# Patient Record
Sex: FEMALE | Race: WHITE | Employment: OTHER | URBAN - METROPOLITAN AREA
[De-identification: names, ages, dates, MRNs, and addresses within clinical notes are randomized per-mention and may not be internally consistent; named-entity substitution may affect disease eponyms.]

---

## 2017-05-01 ENCOUNTER — OFFICE VISIT (OUTPATIENT)
Dept: INTERNAL MEDICINE CLINIC | Age: 62
End: 2017-05-01

## 2017-05-01 VITALS
HEIGHT: 66 IN | DIASTOLIC BLOOD PRESSURE: 70 MMHG | WEIGHT: 151 LBS | RESPIRATION RATE: 16 BRPM | TEMPERATURE: 98.5 F | SYSTOLIC BLOOD PRESSURE: 130 MMHG | OXYGEN SATURATION: 98 % | BODY MASS INDEX: 24.27 KG/M2 | HEART RATE: 54 BPM

## 2017-05-01 DIAGNOSIS — I83.90 VARICOSE VEIN OF LEG: Primary | ICD-10-CM

## 2017-05-01 DIAGNOSIS — Z12.31 ENCOUNTER FOR SCREENING MAMMOGRAM FOR BREAST CANCER: ICD-10-CM

## 2017-05-01 NOTE — MR AVS SNAPSHOT
Visit Information Date & Time Provider Department Dept. Phone Encounter #  
 5/1/2017 11:00 AM Sean Garcia MD [facility] INTERNAL MEDICINE [place] [phone] [id number] Upcoming Health Maintenance Date Due Hepatitis C Screening 1955 DTaP/Tdap/Td series (1 - Tdap) 11/29/1976 PAP AKA CERVICAL CYTOLOGY 11/29/1976 ZOSTER VACCINE AGE 60> 11/29/2015 BREAST CANCER SCRN MAMMOGRAM 1/12/2017 INFLUENZA AGE 9 TO ADULT 8/1/2017 COLONOSCOPY 10/9/2019 Allergies as of 5/1/2017  Review Complete On: 5/1/2017 By: Abdulkadir uY LPN Severity Noted Reaction Type Reactions Latex  01/28/2014    Rash, Contact Dermatitis Statins-hmg-coa Reductase Inhibitors    Myalgia Current Immunizations  Reviewed on 12/15/2016 Name Date Influenza Vaccine (Quad) PF 12/15/2016, 1/28/2016 Not reviewed this visit You Were Diagnosed With   
  
 Codes Comments Varicose vein of leg    -  Primary ICD-10-CM: I83.90 ICD-9-CM: 454.9 Encounter for screening mammogram for breast cancer     ICD-10-CM: Z12.31 
ICD-9-CM: V76.12 Vitals BP Pulse Temp Resp Height(growth percentile) 130/70 (BP 1 Location: Right arm, BP Patient Position: Sitting) (!) 54 98.5 °F (36.9 °C) (Tympanic) 16 5' 6\" (1.676 m) Weight(growth percentile) SpO2 BMI OB Status Smoking Status 151 lb (68.5 kg) 98% 24.37 kg/m2 Hysterectomy Former Smoker Vitals History BMI and BSA Data Body Mass Index Body Surface Area  
 24.37 kg/m 2 1.79 m 2 Preferred Pharmacy Pharmacy Name Phone [facility] [address] [place] [address] [phone] Your Updated Medication List  
  
   
This list is accurate as of: 5/1/17 12:19 PM.  Always use your most recent med list.  
  
  
  
  
 clonazePAM 1 mg tablet Commonly known as:  Robby Moya Take 1 Tab by mouth nightly. fluticasone 50 mcg/actuation nasal spray Commonly known as:  Lawanda Clark 2 spray each nostril daily LEXAPRO 20 mg tablet Generic drug:  escitalopram oxalate Take 20 mg by mouth daily. propranolol LA 80 mg SR capsule Commonly known as:  INDERAL LA Take 1 Cap by mouth daily. We Performed the Following REFERRAL TO VASCULAR CENTER [WHX856 Custom] To-Do List   
 05/01/2017 Imaging:  ALBER 3D NEFTALI W MAMMO BI SCREENING INCL CAD   
  
 05/05/2017 11:30 AM  
  Appointment with [facility] ALBER REYES at [address] [place] [address] ([phone]) PAYMENT  For Non-Medicare patients - $15.00 will be collected from you at the time of your exam.  You will be billed $35.00 from the reading [facility]. OUTSIDE FILMS  - Any outside films related to the study being scheduled should be brought with you on the day of the exam.  If this cannot be done there may be a delay in the reading of the study. MEDICATIONS  - Patient must bring a complete list of all medications currently taking to include prescriptions, over-the-counter meds, herbals, vitamins & any dietary supplements  GENERAL INSTRUCTIONS  - On the day of your exam do not use any bath powder, deodorant or lotions on the armpit area. -Tenderness of breasts may cause an increase of discomfort during procedure. If you are experiencing breast tenderness on the day of your appointment and would like to reschedule, please call [phone]. Referral Information Referral ID Referred By Referred To  
  
 [id number] KIRILL, [address] [facility] [address] [place] [address] [address] [place], [address] Phone: [phone] Fax: [phone] Visits Status Start Date End Date 1 New Request 5/1/17 5/1/18 If your referral has a status of pending review or denied, additional information will be sent to support the outcome of this decision. Patient Instructions Health Maintenance Due Topic Date Due  
  Hepatitis C Test  1955  DTaP/Tdap/Td  (1 - Tdap) 11/29/1976  Cervical Cancer Screening  11/29/1976  Shingles Vaccine  11/29/2015  Breast Cancer Screening  01/12/2017 Introducing [facility] & HE[facility] SERVICES! Saúl Zuluaga introduces Jetpac patient portal. Now you can access parts of your medical record, email your doctor's office, and request medication refills online. 1. In your internet browser, go to [email or web address] 2. Click on the First Time User? Click Here link in the Sign In box. You will see the New Member Sign Up page. 3. Enter your Jetpac Access Code exactly as it appears below. You will not need to use this code after youve completed the sign-up process. If you do not sign up before the expiration date, you must request a new code. · Jetpac Access Code: [email or web address] 
Expires: 7/30/2017 12:19 PM 
 
4. Enter the last four digits of your Social Security Number (xxxx) and Date of Birth (mm/dd/yyyy) as indicated and click Submit. You will be taken to the next sign-up page. 5. Create a Jetpac ID. This will be your Jetpac login ID and cannot be changed, so think of one that is secure and easy to remember. 6. Create a Jetpac password. You can change your password at any time. 7. Enter your Password Reset Question and Answer. This can be used at a later time if you forget your password. 8. Enter your e-mail address. You will receive e-mail notification when new information is available in [address] 9. Click Sign Up. You can now view and download portions of your medical record. 10. Click the Download Summary menu link to download a portable copy of your medical information. If you have questions, please visit the Frequently Asked Questions section of the Jetpac website. Remember, Jetpac is NOT to be used for urgent needs. For medical emergencies, dial 911. Now available from your iPhone and Android! Please provide this summary of care documentation to your next provider. Your primary care clinician is listed as Themaria luz Ledezma. If you have any questions after today's visit, please call [phone].

## 2017-05-01 NOTE — PATIENT INSTRUCTIONS
Health Maintenance Due   Topic Date Due    Hepatitis C Test  1955    DTaP/Tdap/Td  (1 - Tdap) 11/29/1976    Cervical Cancer Screening  11/29/1976    Shingles Vaccine  11/29/2015    Breast Cancer Screening  01/12/2017

## 2017-05-01 NOTE — PROGRESS NOTES
1. Have you been to the ER, urgent care clinic since your last visit? Hospitalized since your last visit? No    2. Have you seen or consulted any other health care providers outside of the [address] [place] [address] since your last visit? Include any pap smears or colon screening.  April 17 - life line screening

## 2017-05-02 NOTE — PROGRESS NOTES
The patient presents to the office today with the chief complaint of left leg pain    HPI    The patient complains of five months of pain in her inner left calf. There is no history of trauma. The patient notes a varicose vein in that area from pregnancy years ago. Review of Systems   Respiratory: Negative for shortness of breath. Cardiovascular: Negative for chest pain and leg swelling. Allergies   Allergen Reactions    Latex Rash and Contact Dermatitis    Statins-Hmg-Coa Reductase Inhibitors Myalgia       Current Outpatient Prescriptions   Medication Sig Dispense Refill    clonazePAM (KLONOPIN) 1 mg tablet Take 1 Tab by mouth nightly.  fluticasone (FLONASE) 50 mcg/actuation nasal spray 2 spray each nostril daily 1 Bottle 1    propranolol LA (INDERAL LA) 80 mg SR capsule Take 1 Cap by mouth daily. 90 Cap 3    escitalopram (LEXAPRO) 20 mg tablet Take 20 mg by mouth daily. Past Medical History:   Diagnosis Date    Benign hematuria     Depressive disorder     Dermatitis     Palpitations     Pure hypercholesterolemia        Past Surgical History:   Procedure Laterality Date    HX HYSTERECTOMY      age 45       Social History     Social History    Marital status:      Spouse name: N/A    Number of children: N/A    Years of education: N/A     Occupational History    Not on file.      Social History Main Topics    Smoking status: Former Smoker     Quit date: 1/7/1994    Smokeless tobacco: Not on file    Alcohol use Yes    Drug use: Not on file    Sexual activity: Not on file     Other Topics Concern    Not on file     Social History Narrative       Patient does not have an advanced directive on file    Visit Vitals    /70 (BP 1 Location: Right arm, BP Patient Position: Sitting)    Pulse (!) 54    Temp 98.5 °F (36.9 °C) (Tympanic)    Resp 16    Ht 5' 6\" (1.676 m)    Wt 151 lb (68.5 kg)    SpO2 98%    BMI 24.37 kg/m2       Physical Exam   No Cervical Lymphadenopathy  No Supraclavicular Lymphadenopathy  Thyroid is Normal  Lungs are clear to ausculation and percussion  Heart:  S1 S2 are normal, No gallops, No mummers  No Carotid Bruits  Breasts:  Normal to inspection and palpation  Abdomen:  Normal Bowel Sounds. No tenderness. No masses. No Hepatomegaly or Splenomegly  LE:  Strong Pedal Pulses. No Edema. Area of tenderness at an isolated varicose veins left inner calf    BMI:  OK    No visits with results within 3 Month(s) from this visit. Latest known visit with results is:    Abstract on 01/03/2014   Component Date Value Ref Range Status    Colonoscopy, External 10/09/2009 sigmoid diverticulosis, 10 years   Final       .No results found for any visits on 05/01/17. Assessment / Plan      ICD-10-CM ICD-9-CM    1. Varicose vein of leg I83.90 454.9 REFERRAL TO VASCULAR CENTER   2. Encounter for screening mammogram for breast cancer Z12.31 V76.12 ALBER 3D NEFTALI W MAMMO BI SCREENING INCL CAD     To Vascular  Screening mammogram  she was advised to continue her maintenance medications    Follow-up Disposition:  Return in about 6 months (around 11/1/2017). I asked Tere Sharma if she has any questions and I answered the questions. Tere Sharma states that she understands the treatment plan and agrees with the treatment plan

## 2017-05-05 ENCOUNTER — HOSPITAL ENCOUNTER (OUTPATIENT)
Dept: MAMMOGRAPHY | Age: 62
Discharge: HOME | End: 2017-05-05
Attending: INTERNAL MEDICINE
Payer: COMMERCIAL

## 2017-05-05 DIAGNOSIS — Z12.31 ENCOUNTER FOR SCREENING MAMMOGRAM FOR BREAST CANCER: ICD-10-CM

## 2017-05-05 PROCEDURE — 77063 BREAST TOMOSYNTHESIS BI: CPT

## 2017-11-15 ENCOUNTER — OFFICE VISIT (OUTPATIENT)
Dept: INTERNAL MEDICINE CLINIC | Age: 62
End: 2017-11-15

## 2017-11-15 VITALS
OXYGEN SATURATION: 98 % | BODY MASS INDEX: 25.07 KG/M2 | HEIGHT: 66 IN | SYSTOLIC BLOOD PRESSURE: 120 MMHG | WEIGHT: 156 LBS | RESPIRATION RATE: 16 BRPM | HEART RATE: 60 BPM | TEMPERATURE: 98 F | DIASTOLIC BLOOD PRESSURE: 80 MMHG

## 2017-11-15 DIAGNOSIS — I83.90 VARICOSE VEIN OF LEG: ICD-10-CM

## 2017-11-15 DIAGNOSIS — J30.2 CHRONIC SEASONAL ALLERGIC RHINITIS DUE TO OTHER ALLERGEN: Primary | ICD-10-CM

## 2017-11-15 DIAGNOSIS — L98.9 SKIN LESION: ICD-10-CM

## 2017-11-15 DIAGNOSIS — Z11.59 ENCOUNTER FOR HEPATITIS C SCREENING TEST FOR LOW RISK PATIENT: ICD-10-CM

## 2017-11-15 RX ORDER — FLUTICASONE PROPIONATE 50 MCG
SPRAY, SUSPENSION (ML) NASAL
Qty: 1 BOTTLE | Refills: 1 | Status: SHIPPED | OUTPATIENT
Start: 2017-11-15

## 2017-11-15 NOTE — PROGRESS NOTES
1. Have you been to the ER, urgent care clinic since your last visit? Hospitalized since your last visit? No    2. Have you seen or consulted any other health care providers outside of the [address] [place] [address] since your last visit? Include any pap smears or colon screening.  No

## 2017-11-15 NOTE — MR AVS SNAPSHOT
Visit Information Date & Time Provider Department Dept. Phone Encounter #  
 11/15/2017 12:00 PM Tamra Zhao MD [facility] INTERNAL MEDICINE OF [place] [phone] [id number] Your Appointments 12/5/2017 11:30 AM  
Office Visit with [facility] ([facility]) Appt Note: iov, ref by dr Kimberly Shelby, vv  
 [address]  
[phone] [address]  
  
    
 6/20/2018 10:30 AM  
Follow Up with Tamra Zhao MD  
[facility] INTERNAL MEDICINE OF [facility] Appt Note: 7 month [address], [address] [facility] [address]  
  
   
 [address], [address] [facility] [address] Upcoming Health Maintenance Date Due Hepatitis C Screening 1955 DTaP/Tdap/Td series (1 - Tdap) 11/29/1976 PAP AKA CERVICAL CYTOLOGY 11/29/1976 ZOSTER VACCINE AGE 60> 9/29/2015 Influenza Age 5 to Adult 8/1/2017 BREAST CANCER SCRN MAMMOGRAM 5/5/2019 COLONOSCOPY 10/9/2019 Allergies as of 11/15/2017  Review Complete On: 11/15/2017 By: Lieutenant Renee LPN Severity Noted Reaction Type Reactions Latex  01/28/2014    Rash, Contact Dermatitis Statins-hmg-coa Reductase Inhibitors    Myalgia Current Immunizations  Reviewed on 12/15/2016 Name Date Influenza Vaccine (Quad) PF 12/15/2016, 1/28/2016 Not reviewed this visit You Were Diagnosed With   
  
 Codes Comments Chronic seasonal allergic rhinitis due to other allergen    -  Primary ICD-10-CM: J30.2 ICD-9-CM: 477.8 Varicose vein of leg     ICD-10-CM: I83.90 ICD-9-CM: 454.9 Encounter for hepatitis C screening test for low risk patient     ICD-10-CM: Z11.59 
ICD-9-CM: V73.89 Skin lesion     ICD-10-CM: L98.9 ICD-9-CM: 709.9 Vitals BP Pulse Temp Resp Height(growth percentile) Weight(growth percentile) 120/80 (BP 1 Location: Left arm, BP Patient Position: Sitting) 60 98 °F (36.7 °C) (Tympanic) 16 5' 6\" (1.676 m) 156 lb (70.8 kg) SpO2 BMI OB Status Smoking Status 98% 25.18 kg/m2 Hysterectomy Former Smoker Vitals History BMI and BSA Data Body Mass Index Body Surface Area  
 25.18 kg/m 2 1.82 m 2 Preferred Pharmacy Pharmacy Name Phone [facility] [address] [phone] Your Updated Medication List  
  
   
This list is accurate as of: 11/15/17 12:55 PM.  Always use your most recent med list.  
  
  
  
  
 clonazePAM 1 mg tablet Commonly known as:  Karyn Broaden Take 1 Tab by mouth nightly. fluticasone 50 mcg/actuation nasal spray Commonly known as:  FLONASE  
2 spray each nostril daily LEXAPRO 20 mg tablet Generic drug:  escitalopram oxalate Take 20 mg by mouth daily. propranolol LA 80 mg SR capsule Commonly known as:  INDERAL LA Take 1 Cap by mouth daily. Prescriptions Sent to Pharmacy Refills  
 fluticasone (FLONASE) 50 mcg/actuation nasal spray 1 Si spray each nostril daily Class: Normal  
 Pharmacy: [facility] [address] [place] [address] #: [phone] We Performed the Following REFERRAL TO DERMATOLOGY [REF19 Custom] Comments:  
 Skin lesion left forearm REFERRAL TO VASCULAR SURGERY [SXA917 Custom] Comments:  
 Varicose veins left leg To-Do List   
 11/15/2017 Lab:  HEPATITIS C AB   
  
 11/15/2017 Lab:  LIPID PANEL   
  
 11/15/2017 Lab:  METABOLIC PANEL, COMPREHENSIVE   
  
 2018 Lab:  CBC WITH AUTOMATED DIFF Referral Information Referral ID Referred By Referred To  
  
 [id number] Bee Benoit [address] [facility] [place], [address] Phone: [phone] Fax: [phone] Visits Status Start Date End Date 1 Authorized 11/15/17 11/15/18 If your referral has a status of pending review or denied, additional information will be sent to support the outcome of this decision. Referral ID Referred By Referred To  
 [id number] Yulia More, [address] [facility]  
   [address] [facility] Phone: [phone] Fax: [phone] Visits Status Start Date End Date 1 New Request 11/15/17 11/15/18 If your referral has a status of pending review or denied, additional information will be sent to support the outcome of this decision. Patient Instructions Health Maintenance Due Topic Date Due  
 Hepatitis C Test  1955  DTaP/Tdap/Td  (1 - Tdap) 11/29/1976  Cervical Cancer Screening  11/29/1976  Shingles Vaccine  09/29/2015  Flu Vaccine  08/01/2017 Introducing [facility] & HEALTH SERVICES! Kashif Hernandez introduces Wheebox patient portal. Now you can access parts of your medical record, email your doctor's office, and request medication refills online. 1. In your internet browser, go to [email or web address] 2. Click on the First Time User? Click Here link in the Sign In box. You will see the New Member Sign Up page. 3. Enter your Wheebox Access Code exactly as it appears below. You will not need to use this code after youve completed the sign-up process. If you do not sign up before the expiration date, you must request a new code. · Wheebox Access Code: [email or web address] Expires: 2/13/2018 12:54 PM 
 
4. Enter the last four digits of your Social Security Number (xxxx) and Date of Birth (mm/dd/yyyy) as indicated and click Submit. You will be taken to the next sign-up page. 5. Create a Wheebox ID. This will be your Drip Int login ID and cannot be changed, so think of one that is secure and easy to remember. 6. Create a TwentyFour6 password. You can change your password at any time. 7. Enter your Password Reset Question and Answer. This can be used at a later time if you forget your password. 8. Enter your e-mail address. You will receive e-mail notification when new information is available in [address] 9. Click Sign Up. You can now view and download portions of your medical record. 10. Click the Download Summary menu link to download a portable copy of your medical information. If you have questions, please visit the Frequently Asked Questions section of the TwentyFour6 website. Remember, TwentyFour6 is NOT to be used for urgent needs. For medical emergencies, dial 911. Now available from your iPhone and Android! Please provide this summary of care documentation to your next provider. Your primary care clinician is listed as Neftali Ferrara. If you have any questions after today's visit, please call [phone].

## 2017-11-15 NOTE — PATIENT INSTRUCTIONS
Health Maintenance Due   Topic Date Due    Hepatitis C Test  1955    DTaP/Tdap/Td  (1 - Tdap) 11/29/1976    Cervical Cancer Screening  11/29/1976    Shingles Vaccine  09/29/2015    Flu Vaccine  08/01/2017

## 2017-11-18 NOTE — PROGRESS NOTES
The patient presents to the office today with the chief complaint of left leg pain    HPI    The patient continues with pain in her left calf along a varicose vein. The patient complains of seasonal sinus congestion and drainage. The patient is due eligible for hepatitis C drainage      Review of Systems   Respiratory: Negative for shortness of breath. Cardiovascular: Negative for chest pain and leg swelling. Skin:        Skin lesion left leg       Allergies   Allergen Reactions    Latex Rash and Contact Dermatitis    Statins-Hmg-Coa Reductase Inhibitors Myalgia       Current Outpatient Prescriptions   Medication Sig Dispense Refill    fluticasone (FLONASE) 50 mcg/actuation nasal spray 2 spray each nostril daily 1 Bottle 1    clonazePAM (KLONOPIN) 1 mg tablet Take 1 Tab by mouth nightly.  propranolol LA (INDERAL LA) 80 mg SR capsule Take 1 Cap by mouth daily. 90 Cap 3    escitalopram (LEXAPRO) 20 mg tablet Take 20 mg by mouth daily. Past Medical History:   Diagnosis Date    Benign hematuria     Depressive disorder     Dermatitis     Palpitations     Pure hypercholesterolemia        Past Surgical History:   Procedure Laterality Date    HX HYSTERECTOMY      age 45       Social History     Social History    Marital status:      Spouse name: N/A    Number of children: N/A    Years of education: N/A     Occupational History    Not on file.      Social History Main Topics    Smoking status: Former Smoker     Quit date: 1/7/1994    Smokeless tobacco: Never Used    Alcohol use Yes    Drug use: Not on file    Sexual activity: Not on file     Other Topics Concern    Not on file     Social History Narrative       Patient does not have an advanced directive on file    Visit Vitals    /80 (BP 1 Location: Left arm, BP Patient Position: Sitting)    Pulse 60    Temp 98 °F (36.7 °C) (Tympanic)    Resp 16    Ht 5' 6\" (1.676 m)    Wt 156 lb (70.8 kg)    SpO2 98%    BMI 25.18 kg/m2       Physical Exam   No Cervical Lymphadenopathy  No Supraclavicular Lymphadenopathy  Thyroid is Normal  Lungs are normal to percussion. Clear to auscultation   Heart:  S1 S2 are normal, No gallops, No mummers  No Carotid Bruits  Abdomen:  Normal Bowel Sounds. No tenderness. No masses. No Hepatomegaly or Splenomegly  LE:  Strong Pedal Pulses. No Edema. Tenderness along a superficial vein in her left calf      BMI:  OK    No visits with results within 3 Month(s) from this visit. Latest known visit with results is:    Abstract on 01/03/2014   Component Date Value Ref Range Status    Colonoscopy, External 10/09/2009 sigmoid diverticulosis, 10 years   Final       .No results found for any visits on 11/15/17. Assessment / Plan      ICD-10-CM ICD-9-CM    1. Chronic seasonal allergic rhinitis due to other allergen J30.2 477.8 fluticasone (FLONASE) 50 mcg/actuation nasal spray      REFERRAL TO VASCULAR SURGERY      CBC WITH AUTOMATED DIFF      METABOLIC PANEL, COMPREHENSIVE      LIPID PANEL      HEPATITIS C AB   2. Varicose vein of leg I83.90 454.9 fluticasone (FLONASE) 50 mcg/actuation nasal spray      REFERRAL TO VASCULAR SURGERY      CBC WITH AUTOMATED DIFF      METABOLIC PANEL, COMPREHENSIVE      LIPID PANEL      HEPATITIS C AB   3. Encounter for hepatitis C screening test for low risk patient Z11.59 V73.89 fluticasone (FLONASE) 50 mcg/actuation nasal spray      REFERRAL TO VASCULAR SURGERY      CBC WITH AUTOMATED DIFF      METABOLIC PANEL, COMPREHENSIVE      LIPID PANEL      HEPATITIS C AB   4. Skin lesion L98.9 709.9 REFERRAL TO DERMATOLOGY       Labs  Add Flonase  she was advised to continue her maintenance medications  To vascular    Follow-up Disposition:  Return in about 6 months (around 5/15/2018). I asked Nick Qureshi if she has any questions and I answered the questions. Nick Sheridan Silence states that she understands the treatment plan and agrees with the treatment plan

## 2017-12-05 ENCOUNTER — OFFICE VISIT (OUTPATIENT)
Dept: VASCULAR SURGERY | Age: 62
End: 2017-12-05

## 2017-12-05 VITALS
DIASTOLIC BLOOD PRESSURE: 94 MMHG | RESPIRATION RATE: 11 BRPM | WEIGHT: 156 LBS | HEIGHT: 66 IN | SYSTOLIC BLOOD PRESSURE: 166 MMHG | BODY MASS INDEX: 25.07 KG/M2

## 2017-12-05 DIAGNOSIS — M79.89 LEG SWELLING: ICD-10-CM

## 2017-12-05 DIAGNOSIS — R52 PAINFUL VEINS: Primary | ICD-10-CM

## 2017-12-05 DIAGNOSIS — M79.605 PAIN OF LEFT LOWER EXTREMITY: ICD-10-CM

## 2017-12-05 DIAGNOSIS — R09.89 PAINFUL VEINS: Primary | ICD-10-CM

## 2017-12-05 NOTE — PROGRESS NOTES
Katelyn Montgomery    Chief Complaint   Patient presents with    New Patient    Varicose Veins     left pain       History and Physical    Ms Arvin Qureshi is here today with her , who is a patient of ours  She is referred for evaluation of painful varicose veins/reflux  She started noticing a prominent vein near her left knee when she was pregnant with her son (which was just about 30 years ago). She has a strong family history of varicose vein issues  Since earlier this year, she has had pretty constant pain at this varicosity. At times it bulges more and she has associated swelling. She has also noticed relatively new appearance of some additional veins in the outer left leg  She used to walk regularly for exercise and that now aggravates pain and she has to stop and rest, and now avoids that high level of activity. When I inquired if she takes anything for the pain, she states she tries to avoid having to take pain relievers, and just rest and allow the pain to resolve on its own over time  But she is frustrated with symptoms and is hopeful for treatment  She did seek out evaluation at another facility about 6 months ago, but was not given any recommendations for relief, and wanted another opinion     Past Medical History:   Diagnosis Date    Benign hematuria     Depressive disorder     Dermatitis     Palpitations     Pure hypercholesterolemia      Patient Active Problem List   Diagnosis Code    Dermatitis L30.9    Benign hematuria N02.9    Palpitations R00.2    Depressive disorder F32.9    Pure hypercholesterolemia E78.00     Past Surgical History:   Procedure Laterality Date    HX HYSTERECTOMY      age 45     Current Outpatient Prescriptions   Medication Sig Dispense Refill    fluticasone (FLONASE) 50 mcg/actuation nasal spray 2 spray each nostril daily 1 Bottle 1    clonazePAM (KLONOPIN) 1 mg tablet Take 1 Tab by mouth nightly.       propranolol LA (INDERAL LA) 80 mg SR capsule Take 1 Cap by mouth daily. 90 Cap 3    escitalopram (LEXAPRO) 20 mg tablet Take 20 mg by mouth daily. Allergies   Allergen Reactions    Latex Rash and Contact Dermatitis    Statins-Hmg-Coa Reductase Inhibitors Myalgia     Social History     Social History    Marital status:      Spouse name: N/A    Number of children: N/A    Years of education: N/A     Occupational History    Not on file. Social History Main Topics    Smoking status: Former Smoker     Quit date: 1/7/1994    Smokeless tobacco: Never Used    Alcohol use Yes    Drug use: Not on file    Sexual activity: Not on file     Other Topics Concern    Not on file     Social History Narrative      Family History   Problem Relation Age of Onset    Stroke Mother     Cancer Mother     Heart Disease Father     Diabetes Father     Hypertension Sister        Review of Systems    Review of Systems - History obtained from the patient  General ROS: negative  Psychological ROS: negative  Ophthalmic ROS: negative  Respiratory ROS: negative  Cardiovascular ROS: negative  Gastrointestinal ROS: negative  Musculoskeletal ROS: negative  Neurological ROS: negative  Dermatological ROS: negative  Vascular ROS: as per HPI        Physical Exam:    Visit Vitals    BP (!) 166/94 (BP 1 Location: Right arm, BP Patient Position: Sitting)    Resp 11    Ht 5' 6\" (1.676 m)    Wt 156 lb (70.8 kg)    BMI 25.18 kg/m2      General:  Alert, cooperative, no distress. Head:  Normocephalic, without obvious abnormality, atraumatic. Eyes:    Conjunctivae/corneas clear. Pupils equal, round, reactive to light. Extraocular movements intact. Extremities: Extremities normal, atraumatic, no cyanosis, trace edema. Small 3-4 mm varicosity near medial left knee. Patch of spider veins lateral left calf   Pulses: 2+ and symmetric all extremities. Skin: Skin color, texture, turgor normal. No rashes or lesions.      Vascular studies:  A study is on file from outlying facility in May 2017 stated some presence of reflux in the L SSV     Impression and Plan:  1. Painful veins    2. Pain of left lower extremity    3. Leg swelling      Orders Placed This Encounter    DUPLEX LOWER EXT VENOUS LEFT AMB (Reflux)     I would repeat a reflux study. I recommended adding use of compression stockings to her routine of rest and elevation  I suggested use of preparationH or horse chestnut seed if symptoms are aggravating and if she avoids pain meds. I gave her literature to review as well  Pending reflux results I will then call with recommendations for treatment options  I did mention ablation and sclerotherapy treatment as some of the procedures we do offer and briefly explained details of those procedures    ROSA MARIA Strickland    Portions of this note have been created using voice recognition software.

## 2017-12-11 ENCOUNTER — OFFICE VISIT (OUTPATIENT)
Dept: VASCULAR SURGERY | Age: 62
End: 2017-12-11

## 2017-12-11 DIAGNOSIS — R09.89 PAINFUL VEINS: ICD-10-CM

## 2017-12-11 DIAGNOSIS — M79.605 PAIN OF LEFT LOWER EXTREMITY: ICD-10-CM

## 2017-12-11 DIAGNOSIS — M79.89 LEG SWELLING: ICD-10-CM

## 2017-12-11 DIAGNOSIS — R52 PAINFUL VEINS: ICD-10-CM

## 2017-12-11 NOTE — PROCEDURES
[facility]   *** FINAL REPORT ***    Name: Karlee Ledesma  MRN: [id number]       Outpatient  : 1955  HIS Order #: [id number]  [address] [facility] Visit #: [id number]  Date: 11 Dec 2017    TYPE OF TEST: Peripheral Venous Testing    REASON FOR TEST  Pain in limb    Left Leg:-  Deep venous thrombosis:           No  Superficial venous thrombosis:    No  Deep venous insufficiency:        Yes  Superficial venous insufficiency: Yes    Abnormal Valve Closure Times (seconds):    Left Common Femoral:  0.7    Left SFJ:             1.9    Vein Mapping:    Diam.   Depth  (mm)    Left Great Saphenous Vein:    High Thigh:      7.1    Mid Thigh:       3.4    Low Thigh:       3.6    Knee:            4.0    High Calf:       3.2    Low Calf: Ankle:    Left Small Saphenous Vein:    SPJ:    Mid Calf: Ankle:    G[place]omini:  Accessory saph.:  Lieberman :  Walker :      INTERPRETATION/FINDINGS  Duplex images were obtained using 2-D gray scale, color flow and  spectral doppler analysis. The reflux exam was performed in the reverse trendelenburg position. Left leg :  1. No evidence of deep venous thrombosis detected in the common  femoral, femoral, profunda, popliteal, posterior tibial or peroneal  veins. 2. Incompetent deep venous system with reflux involving the common  femoral vein. 3. Incompetent great saphenous vein with reflux in the sapheno-femoral   junction. No other reflux detected in the remaining levels assessed. 4. Small saphenous vein is patent without evidence of reflux. 5. Mulitphasic tibial artery doppler signal at rest.  6. Patent contralateral common femoral vein and great saphenous vein  at the sapheno femoral junction without evidence of thrombus. ADDITIONAL COMMENTS    I have personally reviewed the data relevant to the interpretation of  this  study. TECHNOLOGIST: Belinda Gerardo RDMS  Signed: 2017 03:31 PM    PHYSICIAN: Arnel Pena D.O.   Signed: 2017 09:06 AM

## 2017-12-12 ENCOUNTER — DOCUMENTATION ONLY (OUTPATIENT)
Dept: VASCULAR SURGERY | Age: 62
End: 2017-12-12

## 2017-12-12 NOTE — PROGRESS NOTES
Ms Mario Lamar came in recently for evaluation of painful varicose veins/reflux. I called her with her results  She started noticing a prominent vein near her left knee when she was pregnant with her son (which was just about 30 years ago). She has a strong family history of varicose vein issues  Since earlier this year, she has had pretty constant pain at this varicosity. At times it bulges more and she has associated swelling. She has also noticed relatively new appearance of some additional veins in the outer left leg  She used to walk regularly for exercise and that now aggravates pain and she has to stop and rest, and now avoids that high level of activity. When I inquired if she takes anything for the pain, she states she tries to avoid having to take pain relievers, and just rest and allow the pain to resolve on its own over time  But she is frustrated with symptoms and is hopeful for treatment      Left leg :  1. No evidence of deep venous thrombosis detected in the common femoral, femoral, profunda, popliteal, posterior tibial or peroneal veins. 2. Incompetent deep venous system with reflux involving the common femoral vein. 3. Incompetent great saphenous vein with reflux in the sapheno-femoral  junction. No other reflux detected in the remaining levels assessed. 4. Small saphenous vein is patent without evidence of reflux. 5. Mulitphasic tibial artery doppler signal at rest.  6. Patent contralateral common femoral vein and great saphenous vein at the sapheno femoral junction without evidence of thrombus. I contacted her with results to explain that she does in fact have L GSV reflux   I had reviewed results with Dr Eric Borjas  She would in fact be a candidate for L GSV ablation and then we could perhaps follow up with sclerotherapy as well for the lower leg varicosity  Will coordinate efforts to get her scheduled, as she is interested in proceeding. Details of the procedure reviewed.

## 2018-01-10 ENCOUNTER — TELEPHONE (OUTPATIENT)
Dept: VASCULAR SURGERY | Age: 63
End: 2018-01-10

## 2018-01-10 NOTE — TELEPHONE ENCOUNTER
Mr Dav Zazueta returned your call concerning his wife's procedure with Dr. Kavita Yarbrough . He would like for you to call him at your convenience.   Thanks

## 2018-01-11 NOTE — TELEPHONE ENCOUNTER
Called and discussed stocking use, as this had not been previously documented. It was in fact confirmed that Ms Linnette Soto has been wearing compression stockings, and has in fact been using them on and off over the past 3-4 months, but has been more consistent wearing them since her visit here with us one month ago.  Her pain still not greatly affected, so she still wants to have the ablation done

## 2018-01-16 DIAGNOSIS — I87.2 VENOUS (PERIPHERAL) INSUFFICIENCY: Primary | ICD-10-CM

## 2018-01-16 RX ORDER — LORAZEPAM 1 MG/1
TABLET ORAL
Qty: 3 TAB | Refills: 0
Start: 2018-01-16 | End: 2018-07-06 | Stop reason: ALTCHOICE

## 2018-01-16 NOTE — TELEPHONE ENCOUNTER
Order for ativan 1 mg to be taken as directed and brought to procedure #3/0  placed per Verbal order from Dr. Nyla Sullivan . Pt verbalized understanding .

## 2018-01-16 NOTE — TELEPHONE ENCOUNTER
Requested Prescriptions     Pending Prescriptions Disp Refills    propranolol LA (INDERAL LA) 80 mg SR capsule 90 Cap 3     Sig: Take 1 Cap by mouth daily.

## 2018-01-18 RX ORDER — PROPRANOLOL HYDROCHLORIDE 80 MG/1
80 CAPSULE, EXTENDED RELEASE ORAL DAILY
Qty: 90 CAP | Refills: 3 | Status: SHIPPED | OUTPATIENT
Start: 2018-01-18 | End: 2019-01-15 | Stop reason: SDUPTHER

## 2018-01-19 ENCOUNTER — OFFICE VISIT (OUTPATIENT)
Dept: VASCULAR SURGERY | Age: 63
End: 2018-01-19

## 2018-01-19 DIAGNOSIS — I83.892 VARICOSE VEINS OF LEFT LOWER EXTREMITY WITH COMPLICATIONS: Primary | ICD-10-CM

## 2018-01-19 NOTE — PROGRESS NOTES
[facility]          Chart reviewed for the following:   Foreign HERNANDEZ LPN, have reviewed the medications and updated the Allergic reactions for [address] [facility] performed immediately prior to start of procedure:   Foreign HERNANDEZ LPN, have performed the following reviews on [address] [facility] prior to the start of the procedure:            * Patient was identified by name and date of birth   * Agreement that consent was signed and dated  * Procedure site verified   * Patient was positioned for comfort  * Verbal consent was given by patient  * Patients pain level assessment completed     Time: 1430      Date of procedure: 1/19/2018    Procedure performed by:  Martha Collins MD    Specimen sent to lab: NO    How tolerated by patient: tolerated the procedure well with no complications    Discharge instructions reviewed and given to patient:  Yes    Comments:  Applied compression dressing to left leg, just above knee, patient tolerated well and informed patient to keep dressing intact for 24 hours, patient stated understood. Assisted patient with applying thigh high compression stockings and reminded patient to wear day and night for 72 hours and then daily for two weeks, patient stated understood. Reviewed all discharge instructions with patient who stated understood and would call with any questions or concerns.

## 2018-01-19 NOTE — PROGRESS NOTES
[facility]    Cristian Bradshaw    Pre-Operative Diagnosis: Symptomatic superficial venous incompetence  Post-Operative Diagnosis: Symptomatic superficial venous incompetence  Procedure: Endovenous ablation left greater saphenous vein  Surgeon: Thad Lao MD   Anesthesia: Local  Estimated Blood Loss: Minimal  Complications: None    Pre pain assessment: 0 out of 10. Post pain assessment: 0 out of 10. Appropriate timeout was performed\    The patient was placed on the procedure table. The left leg was prepped and draped in the usual sterile fashion. The skin was anesthetized with 1% Xylocaine. Ultrasound guided access was obtained at the at the knee level into the left left  saphenous vein after the vein had been mapped with duplex ultrasound. The micro puncture wire was advanced and sheath placed over the wire into the saphenous vein. The RFA Closure catheter was then advanced under direct visualization to the saphenofemoral junction. The probes of the catheter were opened and the catheter was withdrawn to approximately 2 cm distal to the  saphenofemoral junction. Continuous irrigation through the catheter was done to prevent clotting. After adequate positioning of the catheter had been verified, the leg was infiltrated with Tumescent anesthesia using Lidocaine with epinephrine. It was then proceeded with radio frequency ablation of the saphenous vein starting at the  saphenofemoral level. The patient was placed in Trendelenburg. Pressure was supplied to the vein and the temperature was maintained around 120 degrees. The impedance was within range. The vein was treated down to the level of the knee with excellent results by ultrasound as demonstrated by thickening of the vein wall and no flow. Treatment time was 3 minutes. The catheter was then pulled into the sheath. The sheath was pulled out of the vein and pressure was applied.   The patient was observed for 10 minutes and appeared to be in stable condition. A thigh high compression stocking was applied. Vital signs remained stable throughput the procedure. The patient was then discharged in stable condition and follow-up was arranged.     Kate Gonzalez MD

## 2018-01-19 NOTE — MR AVS SNAPSHOT
[address] [facility] [address] 
[phone] Patient: Krysta Holliday MRN: [id number] :1955 Visit Information Date & Time Provider Department Dept. Phone Encounter #  
 2018  1:30 PM Loetta Goodell, MD [facility] [phone] [id number] Follow-up Instructions Return in 1 week (on 2018), or if symptoms worsen or fail to improve. Follow-up and Disposition History Your Appointments 2018  2:45 PM  
PROCEDURE with [facility] ([address] [place] [address]) Appt Note: l le f/u closure [employer] Karla Hernandez [address] [facility] [address]  
[phone] Karla Hebert [address] [facility]  
  
    
 2018  9:30 AM  
Follow Up with ROSA MAR[facility] ([address] [place] [address]) Appt Note: follow up left leg closure Karla Hernandez [address] [facility] [address]  
[phone] Karla Maldonadoia [address]ning [address] [facility]  
  
    
 2018 10:30 AM  
Follow Up with Kevin Moura MD  
[facility] INTERNAL MEDICINE OF [address]) Appt Note: 7 month [address] [address]  
  
   
 [address] [facility] [address] [facility] [address] Upcoming Health Maintenance Date Due Hepatitis C Screening 1955 DTaP/Tdap/Td series (1 - Tdap) 1976 PAP AKA CERVICAL CYTOLOGY 1976 ZOSTER VACCINE AGE 60> 2015 Influenza Age 5 to Adult 2017 BREAST CANCER SCRN MAMMOGRAM 2019 COLONOSCOPY 10/9/2019 Allergies as of 2018  Review Complete On: 2018 By: Loetta Goodell, MD  
  
 Severity Noted Reaction Type Reactions Latex  2014    Rash, Contact Dermatitis Statins-hmg-coa Reductase Inhibitors    Myalgia Current Immunizations  Reviewed on 12/15/2016 Name Date Influenza Vaccine (Quad) PF 12/15/2016, 1/28/2016 Not reviewed this visit You Were Diagnosed With   
  
 Codes Comments Varicose veins of left lower extremity with complications    -  Primary ICD-10-CM: V62.171 ICD-9-CM: 454.8 Vitals OB Status Smoking Status Hysterectomy Former Smoker Preferred Pharmacy Pharmacy Name Phone [address] [place] [address] Avenue [phone] Your Updated Medication List  
  
   
This list is accurate as of: 1/19/18  3:08 PM.  Always use your most recent med list.  
  
  
  
  
 clonazePAM 1 mg tablet Commonly known as:  Adaline Apley Take 1 Tab by mouth nightly. fluticasone 50 mcg/actuation nasal spray Commonly known as:  FLONASE  
2 spray each nostril daily LEXAPRO 20 mg tablet Generic drug:  escitalopram oxalate Take 20 mg by mouth daily. LORazepam 1 mg tablet Commonly known as:  ATIVAN Take as directed and bring to procedure  
  
 propranolol LA 80 mg SR capsule Commonly known as:  INDERAL LA Take 1 Cap by mouth daily. We Performed the Following ME ENDOVENOUS RF, 1ST VEIN [id number] CPT(R)] Follow-up Instructions Return in 1 week (on 1/26/2018), or if symptoms worsen or fail to improve. To-Do List   
 01/22/2018 Imaging:  DUPLEX LOWER EXT VENOUS LEFT AMB Patient Instructions POST CLOSURE INSTRUCTIONS: 
 
REST FOR THE REMAINDER OF THE DAY AND ELEVATE YOUR LEGS , YOU MAY AMBULATE. FREQUENT AMBULATION, BUT REFRAIN FROM STRENUOUS ACTIVITY, HEAVY LIFTING, OR STANDING FOR LONG PERIODS OF TIME FOR ONE WEEK. RETURN WITH IN A WEEK FOR FOLLOW UP ULTRASOUND EXAMINATION OF THE TREATED VEIN TO RULE OUT THE PRESENCE OF A CLOT IN THE VEIN. THEN WEAR YOUR ELASTIC SUPPORT STOCKINGS NIGHT AND DAY FOR THE FIRST TWO WEEKS. FOR THE NEXT FOUR WEEKS, WEAR THEM DAILY , YOU MAY REMOVE THEM AT NIGHT. WHEN POSSIBLE AVOID ACTIONS THAT COULD CAUSE A SUDDEN INCREASE IN VENOUS PRESSURE (BENDING DOWN QUICKLY, VALSALVA MANEUVER, COUGHING SNEEZING,ETC) PLEASE CALL AND ASK TO SPEAK WITH A NURSE(OR AFTER HOURS TO DOCTOR ON CALL) IF YOU EXPERIENCE: 
 
 
REDNESS OR DRAINAGE AT THE INCISION 
 
INCREASED SWELLING OF THE LEG 
 
TEMPERATURE ABOVE 100.5 ORALLY SEVERE PAIN IN THE LEGS. Please provide this summary of care documentation to your next provider. Your primary care clinician is listed as Britta Mcgarry. If you have any questions after today's visit, please call [phone].

## 2018-01-22 ENCOUNTER — OFFICE VISIT (OUTPATIENT)
Dept: VASCULAR SURGERY | Age: 63
End: 2018-01-22

## 2018-01-22 DIAGNOSIS — I83.892 VARICOSE VEINS OF LEFT LOWER EXTREMITY WITH COMPLICATIONS: ICD-10-CM

## 2018-01-23 DIAGNOSIS — I82.4Y2 ACUTE DEEP VEIN THROMBOSIS (DVT) OF PROXIMAL VEIN OF LEFT LOWER EXTREMITY (HCC): Primary | ICD-10-CM

## 2018-01-23 PROBLEM — I82.409 DVT (DEEP VENOUS THROMBOSIS) (HCC): Status: ACTIVE | Noted: 2018-01-23

## 2018-01-23 NOTE — PROGRESS NOTES
Patient notified of ultrasound results from duplex venous that was completed yesterday and per Dr. Ryan Chavez, patient will be placed on Xarelto starter pack for two weeks and then have a repeat ultrasound. Explained all to patient who stated understood and would call with any questions or concerns.

## 2018-02-07 ENCOUNTER — OFFICE VISIT (OUTPATIENT)
Dept: VASCULAR SURGERY | Age: 63
End: 2018-02-07

## 2018-02-07 DIAGNOSIS — I82.4Y2 ACUTE DEEP VEIN THROMBOSIS (DVT) OF PROXIMAL VEIN OF LEFT LOWER EXTREMITY (HCC): ICD-10-CM

## 2018-02-07 NOTE — PROCEDURES
[facility]   *** FINAL REPORT ***    Name: Amanda Grullon  MRN: [id number]       Outpatient  : 1955  HIS Order #: [id number]  [address] [facility] Visit #: [id number]  Date: 2018    TYPE OF TEST: Peripheral Venous Testing    REASON FOR TEST  Follow up Closure    Left Leg:-  Deep venous thrombosis:           Not examined  Superficial venous thrombosis:    Not examined  Deep venous insufficiency:        Not examined  Superficial venous insufficiency: Not examined      INTERPRETATION/FINDINGS  Post Closure Procedure   Date: 18  Duplex images were obtained using 2-D gray scale, color flow and  spectral doppler analysis. 1. Mechanical ablation of the left great saphenous vein without  involvement of the common femoral vein. EHIT not identified. 2. Classification:  Thigh T2-2A    Knee T2-2B    ADDITIONAL COMMENTS    I have personally reviewed the data relevant to the interpretation of  this  study. TECHNOLOGIST: Moreno Burrell RVT, BS  Signed: 2018 12:41 PM    PHYSICIAN: Go Mcdermott MD  Signed: 2018 02:30 PM

## 2018-02-13 ENCOUNTER — OFFICE VISIT (OUTPATIENT)
Dept: VASCULAR SURGERY | Age: 63
End: 2018-02-13

## 2018-02-13 VITALS
HEIGHT: 66 IN | RESPIRATION RATE: 20 BRPM | DIASTOLIC BLOOD PRESSURE: 64 MMHG | BODY MASS INDEX: 25.07 KG/M2 | SYSTOLIC BLOOD PRESSURE: 106 MMHG | HEART RATE: 64 BPM | WEIGHT: 156 LBS

## 2018-02-13 DIAGNOSIS — I87.2 VENOUS (PERIPHERAL) INSUFFICIENCY: Primary | ICD-10-CM

## 2018-02-13 NOTE — PROGRESS NOTES
Ms Vinnie Ramirez was here to follow up on her recent closure  She had EHIT but had follow up ultrasound which showed resolution  I did not get to discuss this with her  She was emotionally distraught after learning of an outstanding balance, and also coping with some issues related to an illness of her son  She proceed to put her jacket on and left before any discussion occurred    I later called her home to check on her but answering machine picked up  I asked that she call back to let us know how she is doing, and to at least reassure her that her ultrasound results were fine.

## 2018-02-22 ENCOUNTER — OFFICE VISIT (OUTPATIENT)
Dept: VASCULAR SURGERY | Age: 63
End: 2018-02-22

## 2018-02-22 VITALS
BODY MASS INDEX: 25.07 KG/M2 | SYSTOLIC BLOOD PRESSURE: 110 MMHG | WEIGHT: 156 LBS | HEIGHT: 66 IN | RESPIRATION RATE: 20 BRPM | HEART RATE: 65 BPM | DIASTOLIC BLOOD PRESSURE: 70 MMHG

## 2018-02-22 DIAGNOSIS — I83.812 VARICOSE VEINS OF LEFT LOWER EXTREMITY WITH PAIN: Primary | ICD-10-CM

## 2018-02-22 DIAGNOSIS — I87.2 SAPHENOFEMORAL VENOUS REFLUX: ICD-10-CM

## 2018-02-22 NOTE — PROGRESS NOTES
Ms Bertha Maldonado is here following her L GSV ablation  She did have EHIT seen on initial post procedure ultrasound  She was started on a xarelto pack and just completed today  This is fine because her follow up ultrasound shows resolution of this    She has continued wearing her stockings as well  Overall, her leg feels much improved and she is even walking better    However, there was one main varicose vein just below the medial knee (which palpated about 2-3 mm in diameter) which had been quite painful and that vein is still present and tender  Original discussion had been to consider a local sclerotherapy injection following the ablation  I explained details of this with her and she would like to have this done to complete the treatment for her left leg concerns    We will make arrangements for this at the next available time

## 2018-02-22 NOTE — MR AVS SNAPSHOT
Domonique Majestic 
 
 
 [address] [place], [place] [address] [facility] [address] 
[phone] Patient: Garry Dean MRN: [id number] :1955 Visit Information Date & Time Provider Department Dept. Phone Encounter #  
 2018  9:30 AM Vernon Aleman, [address] [facility] [phone] [id number] Your Appointments 2018 10:30 AM  
Follow Up with Meera Sims MD  
[address] [facility]) Appt Note: 7 month [address] [facility] [facility] [address]  
  
   
 [address] [facility], [address] [facility] [address] 2018 11:30 AM  
INJECTION with Tab Queen MD  
[address] [facility]) Appt Note: injection left leg vv  
 [address] [place], [place] [address] [facility] [address]  
[phone] [address] [facility] 1M07  
  
    
 2018 10:30 AM  
INJECTION with Tab Queen MD  
[address] [facility]) Appt Note: 2nd injection left leg [address] [facility] [address]  
[phone] 5/15/2018 10:30 AM  
INJECTION with Tab Queen MD  
[address] [facility]) Appt Note: 3rd injection left leg [address] [facility] [address]  
[phone] Upcoming Health Maintenance Date Due Hepatitis C Screening 1955 DTaP/Tdap/Td series (1 - Tdap) 1976 PAP AKA CERVICAL CYTOLOGY 1976 ZOSTER VACCINE AGE 60> 2015 Influenza Age 5 to Adult 2017 BREAST CANCER SCRN MAMMOGRAM 2019 COLONOSCOPY 10/9/2019 Allergies as of 2018  Review Complete On: 2018 By: Collin Avila Severity Noted Reaction Type Reactions Latex  2014    Rash, Contact Dermatitis Statins-hmg-coa Reductase Inhibitors    Myalgia Current Immunizations  Reviewed on 12/15/2016 Name Date Influenza Vaccine (Quad) PF 12/15/2016, 1/28/2016 Not reviewed this visit Vitals BP Pulse Resp Height(growth percentile) Weight(growth percentile) BMI  
 110/70 (BP 1 Location: Left arm, BP Patient Position: Sitting) 65 20 5' 6\" (1.676 m) 156 lb (70.8 kg) 25.18 kg/m2 OB Status Smoking Status Hysterectomy Former Smoker Vitals History BMI and BSA Data Body Mass Index Body Surface Area  
 25.18 kg/m 2 1.82 m 2 Preferred Pharmacy Pharmacy Name Phone [address] [phone] Your Updated Medication List  
  
   
This list is accurate as of 2/22/18 10:06 AM.  Always use your most recent med list.  
  
  
  
  
 clonazePAM 1 mg tablet Commonly known as:  Merilee Blind Take 1 Tab by mouth nightly. fluticasone 50 mcg/actuation nasal spray Commonly known as:  FLONASE  
2 spray each nostril daily LEXAPRO 20 mg tablet Generic drug:  escitalopram oxalate Take 20 mg by mouth daily. LORazepam 1 mg tablet Commonly known as:  ATIVAN Take as directed and bring to procedure  
  
 propranolol LA 80 mg SR capsule Commonly known as:  INDERAL LA Take 1 Cap by mouth daily. rivaroxaban 15 mg (42)- 20 mg (9) Dspk Commonly known as:  [employer] ISMAEL Take one 15 mg tablet twice a day with food for the first 21 days. Then, take one 20 mg tablet once a day with food for 9 days. Please provide this summary of care documentation to your next provider. Your primary care clinician is listed as Jayjay Martini. If you have any questions after today's visit, please call [phone].

## 2018-04-17 ENCOUNTER — OFFICE VISIT (OUTPATIENT)
Dept: VASCULAR SURGERY | Age: 63
End: 2018-04-17

## 2018-04-17 VITALS
HEART RATE: 64 BPM | SYSTOLIC BLOOD PRESSURE: 112 MMHG | BODY MASS INDEX: 25.07 KG/M2 | DIASTOLIC BLOOD PRESSURE: 64 MMHG | WEIGHT: 156 LBS | HEIGHT: 66 IN

## 2018-04-17 DIAGNOSIS — I83.812 VARICOSE VEINS OF LEG WITH PAIN, LEFT: Primary | ICD-10-CM

## 2018-04-17 NOTE — PROGRESS NOTES
28-year-old female following up for complete vein care. She is happy with her close her legs feel better tells me she can kneel now without discomfort. There is a varicose vein on the medial aspect below the knee and anterior aspect below the knee I did a 0.5% foamy Asclera injections today she tolerated this very nicely it looks well but expect this to be a good result. She got follow-up with results. Did apply a light wrap with a topical steroid over the sites.   She was apologetic about an incident emotionally in the office before and told me she has good help with a local psychiatrist and feels much better

## 2018-04-17 NOTE — MR AVS SNAPSHOT
[address] [address] [address] [facility] [address] 
[phone] Patient: Xi Alexander MRN: [id number] :1955 Visit Information Date & Time Provider Department Dept. Phone Encounter #  
 2018 11:30 AM MD [facility] [phone] [id number] Your Appointments 2018 10:30 AM  
Follow Up with Jefferson Centeno MD  
55 [facility]) Appt Note: 7 month [address], [address] [place] [address]  
  
   
 [address], [address] [place] [address] 2018 10:30 AM  
INJECTION with MD [facility]) Appt Note: 2nd injection left leg 1212 [facility] 275 200 [facility] [address]  
[phone] 12[address] [facility] [place] [address] [facility] 5/15/2018 10:30 AM  
INJECTION with MD [facility]) Appt Note: 3rd injection left leg 1212 [facility] 243 200 [facility] [address]  
[phone] Upcoming Health Maintenance Date Due Hepatitis C Screening 1955 DTaP/Tdap/Td series (1 - Tdap) 1976 PAP AKA CERVICAL CYTOLOGY 1976 ZOSTER VACCINE AGE 60> 2015 Influenza Age 5 to Adult 2017 BREAST CANCER SCRN MAMMOGRAM 2019 COLONOSCOPY 10/9/2019 Allergies as of 2018  Review Complete On: 2018 By: Charles Ortiz LPN Severity Noted Reaction Type Reactions Latex  2014    Rash, Contact Dermatitis Statins-hmg-coa Reductase Inhibitors    Myalgia Current Immunizations  Reviewed on 12/15/2016 Name Date Influenza Vaccine (Quad) PF 12/15/2016, 2016 Not reviewed this visit Vitals  BP Pulse Height(growth percentile) Weight(growth percentile) BMI OB Status 112/64 (BP 1 Location: Left arm, BP Patient Position: Sitting) 64 5' 6\" (1.676 m) 156 lb (70.8 kg) 25.18 kg/m2 Hysterectomy Smoking Status Former Smoker BMI and BSA Data Body Mass Index Body Surface Area  
 25.18 kg/m 2 1.82 m 2 Preferred Pharmacy Pharmacy Name Phone [address] [place] [address] [phone] Your Updated Medication List  
  
   
This list is accurate as of 4/17/18 12:08 PM.  Always use your most recent med list.  
  
  
  
  
 clonazePAM 1 mg tablet Commonly known as:  Dardanelle Rodney Take 1 Tab by mouth nightly. fluticasone 50 mcg/actuation nasal spray Commonly known as:  FLONASE  
2 spray each nostril daily LEXAPRO 20 mg tablet Generic drug:  escitalopram oxalate Take 20 mg by mouth daily. LORazepam 1 mg tablet Commonly known as:  ATIVAN Take as directed and bring to procedure  
  
 propranolol LA 80 mg SR capsule Commonly known as:  INDERAL LA Take 1 Cap by mouth daily. Please provide this summary of care documentation to your next provider. Your primary care clinician is listed as Mack Gao. If you have any questions after today's visit, please call [phone].

## 2018-05-01 ENCOUNTER — OFFICE VISIT (OUTPATIENT)
Dept: VASCULAR SURGERY | Age: 63
End: 2018-05-01

## 2018-05-01 VITALS
HEIGHT: 66 IN | SYSTOLIC BLOOD PRESSURE: 98 MMHG | HEART RATE: 88 BPM | DIASTOLIC BLOOD PRESSURE: 62 MMHG | RESPIRATION RATE: 17 BRPM | WEIGHT: 156 LBS | BODY MASS INDEX: 25.07 KG/M2

## 2018-05-01 DIAGNOSIS — I83.812 VARICOSE VEINS OF LEG WITH PAIN, LEFT: Primary | ICD-10-CM

## 2018-05-01 NOTE — PROGRESS NOTES
Ms. Leonard Kirkpatrick is following up today regarding her painful varicose veins. The sites that I injected last time have a nice appropriate post sclerotherapy appearance. No extravasation or staining. No further veins to evaluate today. Like to take a look at these in a couple weeks she was concerned about some touchiness at the site which there still is. There is no signs of infection and again the do appear appropriate of happy with her outcomes. We will check him in a couple weeks I think they will resolve they may need a touchup sclerotherapy time.

## 2018-05-01 NOTE — PROGRESS NOTES
1. Have you been to an emergency room or urgent care clinic since your last visit? No  Hospitalized since your last visit? If yes, where, when, and reason for visit? NO  2. Have you seen or consulted any other health care providers outside of the [facility] since your last visit including any procedures, health maintenance items. If yes, where, when and reason for visit?  NO

## 2018-07-06 ENCOUNTER — OFFICE VISIT (OUTPATIENT)
Dept: INTERNAL MEDICINE CLINIC | Age: 63
End: 2018-07-06

## 2018-07-06 ENCOUNTER — HOSPITAL ENCOUNTER (OUTPATIENT)
Dept: LAB | Age: 63
Discharge: HOME | End: 2018-07-06
Payer: COMMERCIAL

## 2018-07-06 VITALS
TEMPERATURE: 97.9 F | HEART RATE: 63 BPM | BODY MASS INDEX: 21.86 KG/M2 | OXYGEN SATURATION: 94 % | SYSTOLIC BLOOD PRESSURE: 116 MMHG | DIASTOLIC BLOOD PRESSURE: 62 MMHG | WEIGHT: 136 LBS | HEIGHT: 66 IN

## 2018-07-06 DIAGNOSIS — Z00.00 ANNUAL PHYSICAL EXAM: ICD-10-CM

## 2018-07-06 DIAGNOSIS — R63.4 WEIGHT LOSS: ICD-10-CM

## 2018-07-06 DIAGNOSIS — R10.32 LEFT LOWER QUADRANT PAIN: ICD-10-CM

## 2018-07-06 DIAGNOSIS — K59.09 OTHER CONSTIPATION: Primary | ICD-10-CM

## 2018-07-06 DIAGNOSIS — K59.09 OTHER CONSTIPATION: ICD-10-CM

## 2018-07-06 DIAGNOSIS — E78.00 PURE HYPERCHOLESTEROLEMIA: ICD-10-CM

## 2018-07-06 LAB
ALBUMIN SERPL-MCNC: 3.9 G/DL (ref 3.4–5)
ALBUMIN/GLOB SERPL: 1.4 {RATIO} (ref 0.8–1.7)
ALP SERPL-CCNC: 77 U/L (ref 45–117)
ALT SERPL-CCNC: 16 U/L (ref 13–56)
ANION GAP SERPL CALC-SCNC: 10 MMOL/L (ref 3–18)
AST SERPL-CCNC: 15 U/L (ref 15–37)
BASOPHILS # BLD: 0.1 K/UL (ref 0–0.06)
BASOPHILS NFR BLD: 2 % (ref 0–2)
BILIRUB SERPL-MCNC: 0.5 MG/DL (ref 0.2–1)
BUN SERPL-MCNC: 7 MG/DL (ref 7–18)
BUN/CREAT SERPL: 9 (ref 12–20)
CALCIUM SERPL-MCNC: 9.1 MG/DL (ref 8.5–10.1)
CHLORIDE SERPL-SCNC: 103 MMOL/L (ref 100–108)
CO2 SERPL-SCNC: 27 MMOL/L (ref 21–32)
CREAT SERPL-MCNC: 0.78 MG/DL (ref 0.6–1.3)
CRP SERPL-MCNC: 0.4 MG/DL (ref 0–0.3)
DIFFERENTIAL METHOD BLD: ABNORMAL
EOSINOPHIL # BLD: 0.4 K/UL (ref 0–0.4)
EOSINOPHIL NFR BLD: 8 % (ref 0–5)
ERYTHROCYTE [DISTWIDTH] IN BLOOD BY AUTOMATED COUNT: 13.4 % (ref 11.6–14.5)
ERYTHROCYTE [SEDIMENTATION RATE] IN BLOOD: 6 MM/HR (ref 0–30)
GLOBULIN SER CALC-MCNC: 2.8 G/DL (ref 2–4)
GLUCOSE SERPL-MCNC: 83 MG/DL (ref 74–99)
HCT VFR BLD AUTO: 42 % (ref 35–45)
HGB BLD-MCNC: 14 G/DL (ref 12–16)
LYMPHOCYTES # BLD: 1.8 K/UL (ref 0.9–3.6)
LYMPHOCYTES NFR BLD: 35 % (ref 21–52)
MCH RBC QN AUTO: 30.6 PG (ref 24–34)
MCHC RBC AUTO-ENTMCNC: 33.3 G/DL (ref 31–37)
MCV RBC AUTO: 91.9 FL (ref 74–97)
MONOCYTES # BLD: 0.3 K/UL (ref 0.05–1.2)
MONOCYTES NFR BLD: 6 % (ref 3–10)
NEUTS SEG # BLD: 2.6 K/UL (ref 1.8–8)
NEUTS SEG NFR BLD: 49 % (ref 40–73)
PLATELET # BLD AUTO: 246 K/UL (ref 135–420)
PMV BLD AUTO: 12 FL (ref 9.2–11.8)
POTASSIUM SERPL-SCNC: 4.1 MMOL/L (ref 3.5–5.5)
PROT SERPL-MCNC: 6.7 G/DL (ref 6.4–8.2)
RBC # BLD AUTO: 4.57 M/UL (ref 4.2–5.3)
SODIUM SERPL-SCNC: 140 MMOL/L (ref 136–145)
TSH SERPL DL<=0.05 MIU/L-ACNC: 1 UIU/ML (ref 0.36–3.74)
WBC # BLD AUTO: 5.2 K/UL (ref 4.6–13.2)

## 2018-07-06 PROCEDURE — 36415 COLL VENOUS BLD VENIPUNCTURE: CPT | Performed by: INTERNAL MEDICINE

## 2018-07-06 PROCEDURE — 85025 COMPLETE CBC W/AUTO DIFF WBC: CPT | Performed by: INTERNAL MEDICINE

## 2018-07-06 PROCEDURE — 84443 ASSAY THYROID STIM HORMONE: CPT | Performed by: INTERNAL MEDICINE

## 2018-07-06 PROCEDURE — 85652 RBC SED RATE AUTOMATED: CPT | Performed by: INTERNAL MEDICINE

## 2018-07-06 PROCEDURE — 86140 C-REACTIVE PROTEIN: CPT | Performed by: INTERNAL MEDICINE

## 2018-07-06 PROCEDURE — 80053 COMPREHEN METABOLIC PANEL: CPT | Performed by: INTERNAL MEDICINE

## 2018-07-06 RX ORDER — PROMETHAZINE HYDROCHLORIDE 12.5 MG/1
12.5 TABLET ORAL
Qty: 25 TAB | Refills: 1 | Status: SHIPPED | OUTPATIENT
Start: 2018-07-06 | End: 2019-07-02 | Stop reason: ALTCHOICE

## 2018-07-06 NOTE — PROGRESS NOTES
1. Have you been to the ER, urgent care clinic since your last visit? Hospitalized since your last visit? No    2. Have you seen or consulted any other health care providers outside of the [address] [place] [address] since your last visit? Include any pap smears or colon screening.  No

## 2018-07-06 NOTE — MR AVS SNAPSHOT
[address] [facility] [address] 
 
 
 [address], [address] [place] [address] 
[phone] Patient: Edis Patel MRN: [id number] :1955 Visit Information Date & Time Provider Department Dept. Phone Encounter #  
 2018 11:00 AM Cliff Starr MD [facility] INTERNAL MEDICINE OF [address] [place] [address] [phone] [id number] Follow-up Instructions Return in about 2 weeks (around 2018). Your Appointments 2018 10:15 AM  
Follow Up with Cliff Starr MD  
55 [facility]) Appt Note: 2 weeks [address], [address] [place] [address]  
  
   
 [facility], [address] [place] [address] Upcoming Health Maintenance Date Due Hepatitis C Screening 1955 DTaP/Tdap/Td series (1 - Tdap) 1976 PAP AKA CERVICAL CYTOLOGY 1976 ZOSTER VACCINE AGE 60> 2015 Influenza Age 5 to Adult 2018 BREAST CANCER SCRN MAMMOGRAM 2019 COLONOSCOPY 10/9/2019 Allergies as of 2018  Review Complete On: 2018 By: Cliff Starr MD  
  
 Severity Noted Reaction Type Reactions Latex  2014    Rash, Contact Dermatitis Statins-hmg-coa Reductase Inhibitors    Myalgia Current Immunizations  Reviewed on 12/15/2016 Name Date Influenza Vaccine (Quad) PF 12/15/2016, 2016 Not reviewed this visit You Were Diagnosed With   
  
 Codes Comments Annual physical exam    -  Primary ICD-10-CM: Z00.00 ICD-9-CM: V70.0 Other constipation     ICD-10-CM: K59.09 
ICD-9-CM: 564.09   
 Pure hypercholesterolemia     ICD-10-CM: E78.00 ICD-9-CM: 272.0 Weight loss     ICD-10-CM: R63.4 ICD-9-CM: 783.21 Left lower quadrant pain     ICD-10-CM: R10.32 
ICD-9-CM: 789.04 Vitals BP Pulse Temp Height(growth percentile) Weight(growth percentile)  116/62 (BP 1 Location: Right arm, BP Patient Position: Sitting) 63 97.9 °F (36.6 °C) (Tympanic) 5' 6\" (1.676 m) 136 lb (61.7 kg) SpO2 BMI OB Status Smoking Status 94% 21.95 kg/m2 Hysterectomy Former Smoker Vitals History BMI and BSA Data Body Mass Index Body Surface Area  
 21.95 kg/m 2 1.7 m 2 Preferred Pharmacy Pharmacy Name Phone [address] [phone] Your Updated Medication List  
  
   
This list is accurate as of 7/6/18 12:44 PM.  Always use your most recent med list.  
  
  
  
  
 clonazePAM 1 mg tablet Commonly known as:  Susan Cos Take 1 Tab by mouth nightly. fluticasone 50 mcg/actuation nasal spray Commonly known as:  FLONASE  
2 spray each nostril daily LEXAPRO 20 mg tablet Generic drug:  escitalopram oxalate Take 20 mg by mouth daily. promethazine 12.5 mg tablet Commonly known as:  PHENERGAN Take 1 Tab by mouth every six (6) hours as needed for Nausea. propranolol LA 80 mg SR capsule Commonly known as:  INDERAL LA Take 1 Cap by mouth daily. Prescriptions Sent to Pharmacy Refills  
 promethazine (PHENERGAN) 12.5 mg tablet 1 Sig: Take 1 Tab by mouth every six (6) hours as needed for Nausea. Class: Normal  
 Pharmacy: [facility] [address]ARTINE[address] LEUNG [address] Ph #: [phone] Route: Oral  
  
We Performed the Following REFERRAL TO COLON AND RECTAL SURGERY [REF17 Custom] Follow-up Instructions Return in about 2 weeks (around 7/20/2018). To-Do List   
 07/06/2018 Imaging:  CT ABD PELV W CONT   
  
 07/09/2018 7:30 PM  
  Appointment with [facility] RM 1 at [facility] ([phone]) ORAL CONTRAST/PREP   Oral Contrast/Prep from radiology  no later than one day prior to study date/time.   DIET RESTRICTIONS  Nothing to eat or drink, 4 hours prior to study  May have water to take meds  May drink oral contrast if directed  GENERAL INSTRUCTIONS  If you were given premedications for IV contrast to take prior to having your study, please arrange to have someone drive you to your appointment. If you have had a creatinine level drawn within the past 30 days, please bring most recent results to your appt. MEDICATIONS  Bring a complete list of all medications you are currently taking to include prescriptions, over-the-counter meds, herbals, vitamins & any dietary supplements. RELATED STUDY INFORMATION  Bring any films, CDs, and reports related with you on the day of your exam.  This only includes studies done outside of [address] [place] [address], [facility], [place], and [place]. QUESTIONS  Notify the CT Department if you have any questions concerning your study. [place] - [phone] [facility] - [phone] Referral Information Referral ID Referred By Referred To  
  
 [id number] Artem Ceron, [address] [facility], [address] [facility] [address] Phone: [phone] Fax: [phone] Visits Status Start Date End Date 1 New Request 7/6/18 7/6/19 If your referral has a status of pending review or denied, additional information will be sent to support the outcome of this decision. Referral ID Referred By Referred To  
 [id number] Alfonso HERNANDEZ Not Available Visits Status Start Date End Date 1 New Request 7/6/18 7/6/19 If your referral has a status of pending review or denied, additional information will be sent to support the outcome of this decision. Please provide this summary of care documentation to your next provider. Your primary care clinician is listed as Caesar Richardson. If you have any questions after today's visit, please call [phone].

## 2018-07-07 NOTE — PROGRESS NOTES
The patient presents to the office today with the chief complaint of constipation    HPI    The patient does not feel well. She has 4 months or so of severe constipation. She may go 2 weeks without a bowel movement. The patient has decreased caliber of stools. She has chronic nausea. This gets worse with the constipation and at times she has vomiting. She has cut her oral intake as not to aggravate the nausea. The patient is losing weight. The patient also complains of breasts that are extremely tender. Review of Systems   Constitutional: Positive for weight loss. Respiratory: Negative for shortness of breath. Cardiovascular: Negative for chest pain and leg swelling. Gastrointestinal: Positive for abdominal pain, constipation and nausea. Allergies   Allergen Reactions    Latex Rash and Contact Dermatitis    Statins-Hmg-Coa Reductase Inhibitors Myalgia       Current Outpatient Prescriptions   Medication Sig Dispense Refill    promethazine (PHENERGAN) 12.5 mg tablet Take 1 Tab by mouth every six (6) hours as needed for Nausea. 25 Tab 1    propranolol LA (INDERAL LA) 80 mg SR capsule Take 1 Cap by mouth daily. 90 Cap 3    fluticasone (FLONASE) 50 mcg/actuation nasal spray 2 spray each nostril daily 1 Bottle 1    clonazePAM (KLONOPIN) 1 mg tablet Take 1 Tab by mouth nightly.  escitalopram (LEXAPRO) 20 mg tablet Take 20 mg by mouth daily. Past Medical History:   Diagnosis Date    Benign hematuria     Depressive disorder     Dermatitis     Palpitations     Pure hypercholesterolemia        Past Surgical History:   Procedure Laterality Date    HX HYSTERECTOMY      age 45       Social History     Social History    Marital status:      Spouse name: N/A    Number of children: N/A    Years of education: N/A     Occupational History    Not on file.      Social History Main Topics    Smoking status: Former Smoker     Quit date: 1/7/1994    Smokeless tobacco: Never Used    Alcohol use Yes    Drug use: Not on file    Sexual activity: Not on file     Other Topics Concern    Not on file     Social History Narrative       Patient does not have an advanced directive on file    Visit Vitals    /62 (BP 1 Location: Right arm, BP Patient Position: Sitting)    Pulse 63    Temp 97.9 °F (36.6 °C) (Tympanic)    Ht 5' 6\" (1.676 m)    Wt 136 lb (61.7 kg)    SpO2 94%    BMI 21.95 kg/m2       Physical Exam   Neck: Carotid bruit is not present. Cardiovascular: Normal rate and regular rhythm. Exam reveals no gallop. No murmur heard. Pulmonary/Chest: She has no wheezes. She has no rales. Right breast exhibits tenderness. Right breast exhibits no inverted nipple, no mass and no nipple discharge. Left breast exhibits tenderness. Left breast exhibits no mass, no nipple discharge and no skin change. Breasts are symmetrical.   Abdominal: Soft. Bowel sounds are normal. She exhibits mass (Fllness in left lower quadrant). She exhibits no distension. There is tenderness (Tendeness left lower quadrant). Musculoskeletal: She exhibits no edema. Hospital Outpatient Visit on 07/06/2018   Component Date Value Ref Range Status    WBC 07/06/2018 5.2  4.6 - 13.2 K/uL Final    RBC 07/06/2018 4.57  4.20 - 5.30 M/uL Final    HGB 07/06/2018 14.0  12.0 - 16.0 g/dL Final    HCT 07/06/2018 42.0  35.0 - 45.0 % Final    MCV 07/06/2018 91.9  74.0 - 97.0 FL Final    MCH 07/06/2018 30.6  24.0 - 34.0 PG Final    MCHC 07/06/2018 33.3  31.0 - 37.0 g/dL Final    RDW 07/06/2018 13.4  11.6 - 14.5 % Final    PLATELET 71/82/4815 548  135 - 420 K/uL Final    MPV 07/06/2018 12.0* 9.2 - 11.8 FL Final    NEUTROPHILS 07/06/2018 49  40 - 73 % Final    LYMPHOCYTES 07/06/2018 35  21 - 52 % Final    MONOCYTES 07/06/2018 6  3 - 10 % Final    EOSINOPHILS 07/06/2018 8* 0 - 5 % Final    BASOPHILS 07/06/2018 2  0 - 2 % Final    ABS. NEUTROPHILS 07/06/2018 2.6  1.8 - 8.0 K/UL Final    ABS. LYMPHOCYTES 07/06/2018 1.8  0.9 - 3.6 K/UL Final    ABS. MONOCYTES 07/06/2018 0.3  0.05 - 1.2 K/UL Final    ABS. EOSINOPHILS 07/06/2018 0.4  0.0 - 0.4 K/UL Final    ABS. BASOPHILS 07/06/2018 0.1* 0.0 - 0.06 K/UL Final    DF 07/06/2018 AUTOMATED    Final    Sodium 07/06/2018 140  136 - 145 mmol/L Final    Potassium 07/06/2018 4.1  3.5 - 5.5 mmol/L Final    Chloride 07/06/2018 103  100 - 108 mmol/L Final    CO2 07/06/2018 27  21 - 32 mmol/L Final    Anion gap 07/06/2018 10  3.0 - 18 mmol/L Final    Glucose 07/06/2018 83  74 - 99 mg/dL Final    BUN 07/06/2018 7  7.0 - 18 MG/DL Final    Creatinine 07/06/2018 0.78  0.6 - 1.3 MG/DL Final    BUN/Creatinine ratio 07/06/2018 9* 12 - 20   Final    GFR est AA 07/06/2018 >60  >60 ml/min/1.73m2 Final    GFR est non-AA 07/06/2018 >60  >60 ml/min/1.73m2 Final    Comment: (NOTE)  Estimated GFR is calculated using the Modification of Diet in Renal   Disease (MDRD) Study equation, reported for both  Americans   (GFRAA) and non- Americans (GFRNA), and normalized to 1.73m2   body surface area. The physician must decide which value applies to   the patient. The MDRD study equation should only be used in   individuals age 25 or older. It has not been validated for the   following: pregnant women, patients with serious comorbid conditions,   or on certain medications, or persons with extremes of body size,   muscle mass, or nutritional status.  Calcium 07/06/2018 9.1  8.5 - 10.1 MG/DL Final    Bilirubin, total 07/06/2018 0.5  0.2 - 1.0 MG/DL Final    ALT (SGPT) 07/06/2018 16  13 - 56 U/L Final    AST (SGOT) 07/06/2018 15  15 - 37 U/L Final    Alk.  phosphatase 07/06/2018 77  45 - 117 U/L Final    Protein, total 07/06/2018 6.7  6.4 - 8.2 g/dL Final    Albumin 07/06/2018 3.9  3.4 - 5.0 g/dL Final    Globulin 07/06/2018 2.8  2.0 - 4.0 g/dL Final    A-G Ratio 07/06/2018 1.4  0.8 - 1.7   Final    TSH 07/06/2018 1.00  0.36 - 3.74 uIU/mL Final    C-Reactive protein 07/06/2018 0.4* 0 - 0.3 mg/dL Final    Sed rate, automated 07/06/2018 6  0 - 30 mm/hr Final       .  Results for orders placed or performed during the hospital encounter of 07/06/18   CBC WITH AUTOMATED DIFF   Result Value Ref Range    WBC 5.2 4.6 - 13.2 K/uL    RBC 4.57 4.20 - 5.30 M/uL    HGB 14.0 12.0 - 16.0 g/dL    HCT 42.0 35.0 - 45.0 %    MCV 91.9 74.0 - 97.0 FL    MCH 30.6 24.0 - 34.0 PG    MCHC 33.3 31.0 - 37.0 g/dL    RDW 13.4 11.6 - 14.5 %    PLATELET 395 690 - 410 K/uL    MPV 12.0 (H) 9.2 - 11.8 FL    NEUTROPHILS 49 40 - 73 %    LYMPHOCYTES 35 21 - 52 %    MONOCYTES 6 3 - 10 %    EOSINOPHILS 8 (H) 0 - 5 %    BASOPHILS 2 0 - 2 %    ABS. NEUTROPHILS 2.6 1.8 - 8.0 K/UL    ABS. LYMPHOCYTES 1.8 0.9 - 3.6 K/UL    ABS. MONOCYTES 0.3 0.05 - 1.2 K/UL    ABS. EOSINOPHILS 0.4 0.0 - 0.4 K/UL    ABS. BASOPHILS 0.1 (H) 0.0 - 0.06 K/UL    DF AUTOMATED     METABOLIC PANEL, COMPREHENSIVE   Result Value Ref Range    Sodium 140 136 - 145 mmol/L    Potassium 4.1 3.5 - 5.5 mmol/L    Chloride 103 100 - 108 mmol/L    CO2 27 21 - 32 mmol/L    Anion gap 10 3.0 - 18 mmol/L    Glucose 83 74 - 99 mg/dL    BUN 7 7.0 - 18 MG/DL    Creatinine 0.78 0.6 - 1.3 MG/DL    BUN/Creatinine ratio 9 (L) 12 - 20      GFR est AA >60 >60 ml/min/1.73m2    GFR est non-AA >60 >60 ml/min/1.73m2    Calcium 9.1 8.5 - 10.1 MG/DL    Bilirubin, total 0.5 0.2 - 1.0 MG/DL    ALT (SGPT) 16 13 - 56 U/L    AST (SGOT) 15 15 - 37 U/L    Alk. phosphatase 77 45 - 117 U/L    Protein, total 6.7 6.4 - 8.2 g/dL    Albumin 3.9 3.4 - 5.0 g/dL    Globulin 2.8 2.0 - 4.0 g/dL    A-G Ratio 1.4 0.8 - 1.7     TSH 3RD GENERATION   Result Value Ref Range    TSH 1.00 0.36 - 3.74 uIU/mL   C REACTIVE PROTEIN, QT   Result Value Ref Range    C-Reactive protein 0.4 (H) 0 - 0.3 mg/dL   SED RATE (ESR)   Result Value Ref Range    Sed rate, automated 6 0 - 30 mm/hr       Assessment / Plan      ICD-10-CM ICD-9-CM    1.  Annual physical exam Z00.00 V70.0 REFERRAL TO COLON AND RECTAL SURGERY      CBC WITH AUTOMATED DIFF      METABOLIC PANEL, COMPREHENSIVE      METABOLIC PANEL, BASIC      TSH 3RD GENERATION      C REACTIVE PROTEIN, QT      SED RATE (ESR)   2. Other constipation K59.09 564.09 REFERRAL TO COLON AND RECTAL SURGERY      CBC WITH AUTOMATED DIFF      METABOLIC PANEL, COMPREHENSIVE      METABOLIC PANEL, BASIC      TSH 3RD GENERATION      C REACTIVE PROTEIN, QT      SED RATE (ESR)      CT ABD PELV W CONT   3. Pure hypercholesterolemia E78.00 272.0 REFERRAL TO COLON AND RECTAL SURGERY      CBC WITH AUTOMATED DIFF      METABOLIC PANEL, COMPREHENSIVE      METABOLIC PANEL, BASIC      TSH 3RD GENERATION      C REACTIVE PROTEIN, QT      SED RATE (ESR)   4. Weight loss R63.4 783.21 REFERRAL TO COLON AND RECTAL SURGERY      CBC WITH AUTOMATED DIFF      METABOLIC PANEL, COMPREHENSIVE      METABOLIC PANEL, BASIC      TSH 3RD GENERATION      C REACTIVE PROTEIN, QT      SED RATE (ESR)      CT ABD PELV W CONT   5. Left lower quadrant pain R10.32 789.04 CT ABD PELV W CONT       Labs ordered  CT scan abdomen - pelvis  Phenergan for nausea  Refer for colonoscopy  Mammogram once GI problems are addresed    Follow-up Disposition:  Return in about 2 weeks (around 7/20/2018). I asked Neeta Hernandez if she has any questions and I answered the questions. Neeta Hernandez states that she understands the treatment plan and agrees with the treatment plan

## 2018-07-09 ENCOUNTER — HOSPITAL ENCOUNTER (OUTPATIENT)
Dept: CT IMAGING | Age: 63
Discharge: HOME | End: 2018-07-09
Attending: INTERNAL MEDICINE
Payer: COMMERCIAL

## 2018-07-09 DIAGNOSIS — R10.32 LEFT LOWER QUADRANT PAIN: ICD-10-CM

## 2018-07-09 DIAGNOSIS — K59.09 OTHER CONSTIPATION: ICD-10-CM

## 2018-07-09 DIAGNOSIS — R63.4 WEIGHT LOSS: ICD-10-CM

## 2018-07-09 PROCEDURE — 74177 CT ABD & PELVIS W/CONTRAST: CPT

## 2018-07-09 PROCEDURE — 74011636320 HC RX REV CODE- 636/320: Performed by: INTERNAL MEDICINE

## 2018-07-09 RX ADMIN — IOPAMIDOL 100 ML: 612 INJECTION, SOLUTION INTRAVENOUS at 20:00

## 2018-07-16 ENCOUNTER — OFFICE VISIT (OUTPATIENT)
Dept: SURGERY | Age: 63
End: 2018-07-16

## 2018-07-16 VITALS
TEMPERATURE: 97.8 F | HEIGHT: 66 IN | DIASTOLIC BLOOD PRESSURE: 70 MMHG | BODY MASS INDEX: 21.86 KG/M2 | WEIGHT: 136 LBS | HEART RATE: 56 BPM | SYSTOLIC BLOOD PRESSURE: 120 MMHG | RESPIRATION RATE: 18 BRPM

## 2018-07-16 DIAGNOSIS — K59.00 CONSTIPATION, UNSPECIFIED CONSTIPATION TYPE: Primary | ICD-10-CM

## 2018-07-16 DIAGNOSIS — R63.4 WEIGHT LOSS: ICD-10-CM

## 2018-07-16 DIAGNOSIS — K62.5 RECTAL BLEEDING: ICD-10-CM

## 2018-07-16 DIAGNOSIS — K64.0 GRADE I HEMORRHOIDS: ICD-10-CM

## 2018-07-16 NOTE — PROGRESS NOTES
HPI: Yadira Menon is a 58 y.o. female presenting with chief complain of constipation, rectal bleeding and lower abdominal pain. This began last November and has gotten progressively worse. She has been vomiting episodes while trying to go to the bathroom. She is unable to take adequate p.o. Pain is in the left lower quadrant. It takes her between 3 days in 1 week to move her bowels. She has tried a mild over-the-counter laxative. Stools are firm. She denies fecal incontinence. She had a colonoscopy 9 years ago by rib that with a 10 year recall. Past Medical History:   Diagnosis Date    Benign hematuria     Depressive disorder     Dermatitis     Palpitations     Pure hypercholesterolemia        Past Surgical History:   Procedure Laterality Date    HX HYSTERECTOMY      age 45       Family History   Problem Relation Age of Onset    Stroke Mother     Cancer Mother     Heart Disease Father     Diabetes Father     Hypertension Sister        Social History     Social History    Marital status:      Spouse name: N/A    Number of children: N/A    Years of education: N/A     Social History Main Topics    Smoking status: Former Smoker     Quit date: 1/7/1994    Smokeless tobacco: Never Used    Alcohol use Yes      Comment: occasional    Drug use: None    Sexual activity: Not Asked     Other Topics Concern    None     Social History Narrative       Review of Systems - Review of Systems   Constitutional: Positive for chills, malaise/fatigue and weight loss. Negative for diaphoresis and fever. HENT: Negative. Negative for ear discharge, ear pain, hearing loss, nosebleeds and tinnitus. Eyes: Negative. Respiratory: Negative. Cardiovascular: Positive for chest pain. Negative for palpitations, orthopnea, claudication, leg swelling and PND. Gastrointestinal: Positive for abdominal pain, blood in stool, constipation, heartburn, nausea and vomiting. Negative for diarrhea and melena. Genitourinary: Negative. Musculoskeletal: Negative. Skin: Negative. Neurological: Positive for weakness. Negative for dizziness, tingling, tremors, sensory change, speech change, focal weakness, seizures, loss of consciousness and headaches. Endo/Heme/Allergies: Negative. Psychiatric/Behavioral: Negative for depression, hallucinations, memory loss, substance abuse and suicidal ideas. The patient is nervous/anxious. The patient does not have insomnia. Outpatient Prescriptions Marked as Taking for the 7/16/18 encounter (Office Visit) with Glynis Homans, MD   Medication Sig Dispense Refill    promethazine (PHENERGAN) 12.5 mg tablet Take 1 Tab by mouth every six (6) hours as needed for Nausea. 25 Tab 1    propranolol LA (INDERAL LA) 80 mg SR capsule Take 1 Cap by mouth daily. 90 Cap 3    fluticasone (FLONASE) 50 mcg/actuation nasal spray 2 spray each nostril daily 1 Bottle 1    clonazePAM (KLONOPIN) 1 mg tablet Take 1 Tab by mouth nightly.  escitalopram (LEXAPRO) 20 mg tablet Take 20 mg by mouth daily. Allergies   Allergen Reactions    Latex Rash and Contact Dermatitis    Statins-Hmg-Coa Reductase Inhibitors Myalgia       Vitals:    07/16/18 1409   BP: 120/70   Pulse: (!) 56   Resp: 18   Temp: 97.8 °F (36.6 °C)   Weight: 61.7 kg (136 lb)   Height: 5' 6\" (1.676 m)   PainSc:   5   PainLoc: Abdomen       Physical Exam   Constitutional: She appears well-developed and well-nourished. HENT:   Head: Normocephalic and atraumatic. Eyes: Conjunctivae and EOM are normal.   Abdominal: Soft. She exhibits no distension. There is no tenderness. Musculoskeletal: Normal range of motion. Lymphadenopathy:     She has no cervical adenopathy. Right: No inguinal adenopathy present. Left: No inguinal adenopathy present. Neurological: She exhibits normal muscle tone. Skin: Skin is warm and dry. No rash noted. Psychiatric: She has a normal mood and affect.  Her speech is normal.   Rectum: No external hemorrhoidal disease or fissures  Digital rectal exam: Moderate tone, no mass  Anoscopy: Left lateral internal hemorrhoid versus partial mucosal prolapse, no prolapse beyond the anal verge on Valsalva     Assessment / Plan    Severe constipation, weight loss, rectal bleeding  Schedule colonoscopy  Start MiraLAX      The diagnoses and plan were discussed with the patient. All questions answered. Plan of care agreed to by all concerned.

## 2018-07-16 NOTE — LETTER
7/16/2018 2:54 PM 
 
Patient:  Chandrakant Su YOB: 1955 Date of Visit: 7/16/2018 Tin Matute MD 
[address] [facility] [address] VIA In Basket Dear Vannessa Dobbs, I saw Rodrigo Greenberg in the office today for worsening constipation, weight loss, abdominal pain and rectal bleeding. On exam she does have mild internal hemorrhoids in left lateral region seen on anoscopy. Her last colonoscopy was close to 10 years ago by Dr. Inocente Muro. Her thyroid panel and calcium levels that you checked recently were both normal. 
 
I have asked her to try MiraLAX to see if this helps with the constipation we will schedule her for a full colonoscopy as soon as possible. Thank you very much for your referral of Ms. Veronica Bianchi. If you have questions, please do not hesitate to call me. I look forward to following Ms. Mariely Maldonado along with you and will keep you updated as to her progress. Sincerely, Raúl Baum MD

## 2018-07-16 NOTE — MR AVS SNAPSHOT
[address] [place] [address] 178 [facility] 
[phone] Patient: Lex Murrell MRN: [id number] :1955 Visit Information Date & Time Provider Department Dept. Phone Encounter #  
 2018  1:45 PM Windsor Gottron,  [address] [id number] Your Appointments 2018 10:15 AM  
Follow Up with Walter Sears MD  
[address] [facility]) Appt Note: 2 weeks [address], [address]  
  
   
 [address], [address] [facility] [address] Upcoming Health Maintenance Date Due Hepatitis C Screening 1955 DTaP/Tdap/Td series (1 - Tdap) 1976 PAP AKA CERVICAL CYTOLOGY 1976 ZOSTER VACCINE AGE 60> 2015 Influenza Age 5 to Adult 2018 BREAST CANCER SCRN MAMMOGRAM 2019 COLONOSCOPY 10/9/2019 Allergies as of 2018  Review Complete On: 2018 By: Windsor Gottron, MD  
  
 Severity Noted Reaction Type Reactions Latex  2014    Rash, Contact Dermatitis Statins-hmg-coa Reductase Inhibitors    Myalgia Current Immunizations  Reviewed on 12/15/2016 Name Date Influenza Vaccine (Quad) PF 12/15/2016, 2016 Not reviewed this visit You Were Diagnosed With   
  
 Codes Comments Constipation, unspecified constipation type    -  Primary ICD-10-CM: K59.00 ICD-9-CM: 564.00 Weight loss     ICD-10-CM: R63.4 ICD-9-CM: 783.21 Vitals BP Pulse Temp Resp Height(growth percentile) Weight(growth percentile) 120/70 (!) 56 97.8 °F (36.6 °C) 18 5' 6\" (1.676 m) 136 lb (61.7 kg) BMI OB Status Smoking Status 21.95 kg/m2 Hysterectomy Former Smoker Vitals History BMI and BSA Data Body Mass Index Body Surface Area  
 21.95 kg/m 2 1.7 m 2 Preferred Pharmacy Pharmacy Name Phone [address] [place], [address] [place] [address] Avenue [phone] Your Updated Medication List  
  
   
This list is accurate as of 7/16/18  2:50 PM.  Always use your most recent med list.  
  
  
  
  
 clonazePAM 1 mg tablet Commonly known as:  Zetta Ulises Take 1 Tab by mouth nightly. fluticasone 50 mcg/actuation nasal spray Commonly known as:  FLONASE  
2 spray each nostril daily LEXAPRO 20 mg tablet Generic drug:  escitalopram oxalate Take 20 mg by mouth daily. promethazine 12.5 mg tablet Commonly known as:  PHENERGAN Take 1 Tab by mouth every six (6) hours as needed for Nausea. propranolol LA 80 mg SR capsule Commonly known as:  INDERAL LA Take 1 Cap by mouth daily. Please provide this summary of care documentation to your next provider. Your primary care clinician is listed as Zigmund Speaker. If you have any questions after today's visit, please call [phone].

## 2018-08-07 ENCOUNTER — ANESTHESIA EVENT (OUTPATIENT)
Dept: ENDOSCOPY | Age: 63
End: 2018-08-07
Payer: COMMERCIAL

## 2018-08-08 ENCOUNTER — HOSPITAL ENCOUNTER (OUTPATIENT)
Age: 63
Setting detail: OUTPATIENT SURGERY
Discharge: HOME | End: 2018-08-08
Attending: COLON & RECTAL SURGERY | Admitting: COLON & RECTAL SURGERY
Payer: COMMERCIAL

## 2018-08-08 ENCOUNTER — ANESTHESIA (OUTPATIENT)
Dept: ENDOSCOPY | Age: 63
End: 2018-08-08
Payer: COMMERCIAL

## 2018-08-08 VITALS
HEIGHT: 66 IN | WEIGHT: 140 LBS | HEART RATE: 66 BPM | OXYGEN SATURATION: 100 % | DIASTOLIC BLOOD PRESSURE: 49 MMHG | TEMPERATURE: 97.6 F | BODY MASS INDEX: 22.5 KG/M2 | SYSTOLIC BLOOD PRESSURE: 98 MMHG | RESPIRATION RATE: 12 BRPM

## 2018-08-08 PROCEDURE — 76060000032 HC ANESTHESIA 0.5 TO 1 HR: Performed by: COLON & RECTAL SURGERY

## 2018-08-08 PROCEDURE — 74011250636 HC RX REV CODE- 250/636

## 2018-08-08 PROCEDURE — 74011250636 HC RX REV CODE- 250/636: Performed by: NURSE ANESTHETIST, CERTIFIED REGISTERED

## 2018-08-08 PROCEDURE — 88305 TISSUE EXAM BY PATHOLOGIST: CPT | Performed by: COLON & RECTAL SURGERY

## 2018-08-08 PROCEDURE — 77030013992 HC SNR POLYP ENDOSC BSC -B: Performed by: COLON & RECTAL SURGERY

## 2018-08-08 PROCEDURE — 76040000007: Performed by: COLON & RECTAL SURGERY

## 2018-08-08 PROCEDURE — 77030009426 HC FCPS BIOP ENDOSC BSC -B: Performed by: COLON & RECTAL SURGERY

## 2018-08-08 RX ORDER — SODIUM CHLORIDE, SODIUM LACTATE, POTASSIUM CHLORIDE, CALCIUM CHLORIDE 600; 310; 30; 20 MG/100ML; MG/100ML; MG/100ML; MG/100ML
75 INJECTION, SOLUTION INTRAVENOUS CONTINUOUS
Status: DISCONTINUED | OUTPATIENT
Start: 2018-08-08 | End: 2018-08-08 | Stop reason: HOSPADM

## 2018-08-08 RX ORDER — PROPOFOL 10 MG/ML
INJECTION, EMULSION INTRAVENOUS AS NEEDED
Status: DISCONTINUED | OUTPATIENT
Start: 2018-08-08 | End: 2018-08-08 | Stop reason: HOSPADM

## 2018-08-08 RX ORDER — SODIUM CHLORIDE, SODIUM LACTATE, POTASSIUM CHLORIDE, CALCIUM CHLORIDE 600; 310; 30; 20 MG/100ML; MG/100ML; MG/100ML; MG/100ML
INJECTION, SOLUTION INTRAVENOUS
Status: DISCONTINUED | OUTPATIENT
Start: 2018-08-08 | End: 2018-08-08 | Stop reason: HOSPADM

## 2018-08-08 RX ORDER — FAMOTIDINE 20 MG/1
20 TABLET, FILM COATED ORAL ONCE
Status: DISCONTINUED | OUTPATIENT
Start: 2018-08-08 | End: 2018-08-08 | Stop reason: HOSPADM

## 2018-08-08 RX ADMIN — PROPOFOL 50 MG: 10 INJECTION, EMULSION INTRAVENOUS at 14:27

## 2018-08-08 RX ADMIN — PROPOFOL 100 MG: 10 INJECTION, EMULSION INTRAVENOUS at 14:07

## 2018-08-08 RX ADMIN — PROPOFOL 50 MG: 10 INJECTION, EMULSION INTRAVENOUS at 14:20

## 2018-08-08 RX ADMIN — PROPOFOL 50 MG: 10 INJECTION, EMULSION INTRAVENOUS at 14:13

## 2018-08-08 RX ADMIN — SODIUM CHLORIDE, SODIUM LACTATE, POTASSIUM CHLORIDE, AND CALCIUM CHLORIDE 75 ML/HR: 600; 310; 30; 20 INJECTION, SOLUTION INTRAVENOUS at 13:24

## 2018-08-08 RX ADMIN — SODIUM CHLORIDE, SODIUM LACTATE, POTASSIUM CHLORIDE, CALCIUM CHLORIDE: 600; 310; 30; 20 INJECTION, SOLUTION INTRAVENOUS at 14:07

## 2018-08-08 NOTE — H&P (VIEW-ONLY)
HPI: Ceci Ham is a 58 y.o. female presenting with chief complain of constipation, rectal bleeding and lower abdominal pain. This began last November and has gotten progressively worse. She has been vomiting episodes while trying to go to the bathroom. She is unable to take adequate p.o. Pain is in the left lower quadrant. It takes her between 3 days in 1 week to move her bowels. She has tried a mild over-the-counter laxative. Stools are firm. She denies fecal incontinence. She had a colonoscopy 9 years ago by rib that with a 10 year recall. Past Medical History:   Diagnosis Date    Benign hematuria     Depressive disorder     Dermatitis     Palpitations     Pure hypercholesterolemia        Past Surgical History:   Procedure Laterality Date    HX HYSTERECTOMY      age 45       Family History   Problem Relation Age of Onset    Stroke Mother     Cancer Mother     Heart Disease Father     Diabetes Father     Hypertension Sister        Social History     Social History    Marital status:      Spouse name: N/A    Number of children: N/A    Years of education: N/A     Social History Main Topics    Smoking status: Former Smoker     Quit date: 1/7/1994    Smokeless tobacco: Never Used    Alcohol use Yes      Comment: occasional    Drug use: None    Sexual activity: Not Asked     Other Topics Concern    None     Social History Narrative       Review of Systems - Review of Systems   Constitutional: Positive for chills, malaise/fatigue and weight loss. Negative for diaphoresis and fever. HENT: Negative. Negative for ear discharge, ear pain, hearing loss, nosebleeds and tinnitus. Eyes: Negative. Respiratory: Negative. Cardiovascular: Positive for chest pain. Negative for palpitations, orthopnea, claudication, leg swelling and PND. Gastrointestinal: Positive for abdominal pain, blood in stool, constipation, heartburn, nausea and vomiting. Negative for diarrhea and melena. Genitourinary: Negative. Musculoskeletal: Negative. Skin: Negative. Neurological: Positive for weakness. Negative for dizziness, tingling, tremors, sensory change, speech change, focal weakness, seizures, loss of consciousness and headaches. Endo/Heme/Allergies: Negative. Psychiatric/Behavioral: Negative for depression, hallucinations, memory loss, substance abuse and suicidal ideas. The patient is nervous/anxious. The patient does not have insomnia. Outpatient Prescriptions Marked as Taking for the 7/16/18 encounter (Office Visit) with Ema Chung MD   Medication Sig Dispense Refill    promethazine (PHENERGAN) 12.5 mg tablet Take 1 Tab by mouth every six (6) hours as needed for Nausea. 25 Tab 1    propranolol LA (INDERAL LA) 80 mg SR capsule Take 1 Cap by mouth daily. 90 Cap 3    fluticasone (FLONASE) 50 mcg/actuation nasal spray 2 spray each nostril daily 1 Bottle 1    clonazePAM (KLONOPIN) 1 mg tablet Take 1 Tab by mouth nightly.  escitalopram (LEXAPRO) 20 mg tablet Take 20 mg by mouth daily. Allergies   Allergen Reactions    Latex Rash and Contact Dermatitis    Statins-Hmg-Coa Reductase Inhibitors Myalgia       Vitals:    07/16/18 1409   BP: 120/70   Pulse: (!) 56   Resp: 18   Temp: 97.8 °F (36.6 °C)   Weight: 61.7 kg (136 lb)   Height: 5' 6\" (1.676 m)   PainSc:   5   PainLoc: Abdomen       Physical Exam   Constitutional: She appears well-developed and well-nourished. HENT:   Head: Normocephalic and atraumatic. Eyes: Conjunctivae and EOM are normal.   Abdominal: Soft. She exhibits no distension. There is no tenderness. Musculoskeletal: Normal range of motion. Lymphadenopathy:     She has no cervical adenopathy. Right: No inguinal adenopathy present. Left: No inguinal adenopathy present. Neurological: She exhibits normal muscle tone. Skin: Skin is warm and dry. No rash noted. Psychiatric: She has a normal mood and affect.  Her speech is normal.   Rectum: No external hemorrhoidal disease or fissures  Digital rectal exam: Moderate tone, no mass  Anoscopy: Left lateral internal hemorrhoid versus partial mucosal prolapse, no prolapse beyond the anal verge on Valsalva     Assessment / Plan    Severe constipation, weight loss, rectal bleeding  Schedule colonoscopy  Start MiraLAX      The diagnoses and plan were discussed with the patient. All questions answered. Plan of care agreed to by all concerned.

## 2018-08-08 NOTE — ANESTHESIA POSTPROCEDURE EVALUATION
Post-Anesthesia Evaluation & Assessment    Visit Vitals    BP 98/53    Pulse 80    Temp 36.4 °C (97.6 °F)    Resp 14    Ht 5' 6\" (1.676 m)    Wt 63.5 kg (140 lb)    SpO2 100%    BMI 22.6 kg/m2       Post-operative hydration adequate. Pain score (VAS): 0 Pain Scale 1: Numeric (0 - 10) (08/08/18 1311)  Pain Intensity 1: 0 (08/08/18 1311)   Managed. Mental status & Level of consciousness: returned to baseline    Neurological status: returned to baseline    Pulmonary status: airway patent, oxygen given as needed. Complications related to anesthesia: none    Patient has met all discharge requirements.     Additional comments:        Waylon Arana MD  August 8, 2018

## 2018-08-08 NOTE — PROCEDURES
[facility]  [facility] [address]   [place]ak[place] rodriguez, [address]  [phone]                    Colonoscopy Procedure Note      Eleanor Felipe  1955  [id number]                Date of Procedure: 8/8/2018    Preoperative diagnosis: K59.00,  Constipation, unspecified type  R63.4,  Weight loss  K62.5,  Rectal bleeding    Postoperative diagnosis: Colon Polyps of ascending colon x 4    :  Gamaliel Najera MD    Assistant(s): Endoscopy Technician-1: Manav Simon  Endoscopy RN-1: Alfa Varela RN    Sedation: MAC    Procedure Details:  Prior to the procedure, a history and physical were performed. The patients medications, allergies and sensitivities were reviewed and all questions were answered. After informed consent was obtained for the procedure, with all risks and benefits of procedure explained. The patient was taken to the endoscopy suite and placed in the left lateral decubitus position. Patient identification and proposed procedure were verified prior to the procedure by the nurse and I. Following sequential administration of sedation as per Anesthesia, a digital rectal exam was performed and was normal.  The Olympus video colonoscope was introduced through the anus and advanced to cecum, which was identified by the ileocecal valve and appendiceal orifice. The quality of preparation was good. The colonoscope was slowly withdrawn and the mucosa examined for any abnormalities. Cecal withdrawl time was greater than six minutes. The patient tolerated the procedure well.       Findings/Interventions:   Polyps - #1, 10 mm in size, located in the ascending colon, mid, removed by snare cautery and retrieved for pathology, - #2, 5 mm in size, located in the ascending colon, distal, removed by cold biopsy and sent for pathology, - #3, 5 mm in size, located in the ascending colon, distal, removed by cold biopsy and sent for pathology, - #4, 5 mm in size, located in the ascending colon, distal, removed by cold biopsy and sent for pathology    EBL: none    Recommendations: -Repeat colonoscopy in 3 years. NO aspirin for 5 days.      Discharge Disposition:  Sage Alvarado MD  8/8/2018  2:40 PM

## 2018-08-08 NOTE — IP AVS SNAPSHOT
[address] [facility] [address] 
 
 
 [address] [facility] [address] [facility] [place] [address] [phone] Patient: Lex Murrell MRN: [id number] :1955 About your hospitalization You were admitted on:  2018 You last received care in the:  [facility] You were discharged on:  2018 Why you were hospitalized Your primary diagnosis was:  Not on File Follow-up Information Follow up With Details Comments Contact Info Walter Sears MD   [address] [facility] [address] [phone] Your Scheduled Appointments 2018 COLONOSCOPY with Windsor Gottron, MD  
[facility] ([facility]) [address] [facility] [address] [address] [phone] Discharge Orders None A check fan indicates which time of day the medication should be taken. My Medications CONTINUE taking these medications Instructions Each Dose to Equal  
 Morning Noon Evening Bedtime  
 clonazePAM 1 mg tablet Commonly known as:  Parley Sea Your last dose was: Your next dose is: Take 1 Tab by mouth nightly. 1 Tab  
    
   
   
   
  
 fluticasone 50 mcg/actuation nasal spray Commonly known as:  Carmelita Serene Your last dose was: Your next dose is:    
   
   
 2 spray each nostril daily LEXAPRO 20 mg tablet Generic drug:  escitalopram oxalate Your last dose was: Your next dose is: Take 20 mg by mouth daily. 20 mg  
    
   
   
   
  
 promethazine 12.5 mg tablet Commonly known as:  PHENERGAN Your last dose was: Your next dose is: Take 1 Tab by mouth every six (6) hours as needed for Nausea. 12.5 mg  
    
   
   
   
  
 propranolol LA 80 mg SR capsule Commonly known as:  INDERAL LA Your last dose was: Your next dose is: Take 1 Cap by mouth daily. 80 mg Discharge Instructions Colon Polyps: Care Instructions Your Care Instructions Colon polyps are growths in the colon or the rectum. The cause of most colon polyps is not known, and most people who get them do not have any problems. But a certain kind can turn into cancer. For this reason, regular testing for colon polyps is important for people age 48 and older and anyone who has an increased risk for colon cancer. Polyps are usually found through routine colon cancer screening tests. Although most colon polyps are not cancerous, they are usually removed and then tested for cancer. Screening for colon cancer saves lives because the cancer can usually be cured if it is caught early. If you have a polyp that is the type that can turn into cancer, you may need more tests to examine your entire colon. The doctor will remove any other polyps that he or she finds, and you will be tested more often. Follow-up care is a key part of your treatment and safety. Be sure to make and go to all appointments, and call your doctor if you are having problems. It's also a good idea to know your test results and keep a list of the medicines you take. How can you care for yourself at home? Regular exams to look for colon polyps are the best way to prevent polyps from turning into colon cancer. These can include stool tests, sigmoidoscopy, colonoscopy, and CT colonography. Talk with your doctor about a testing schedule that is right for you. To prevent polyps There is no home treatment that can prevent colon polyps. But these steps may help lower your risk for cancer. · Stay active. Being active can help you get to and stay at a healthy weight. Try to exercise on most days of the week. Walking is a good choice. · Eat well. Choose a variety of vegetables, fruits, legumes (such as peas and beans), fish, poultry, and whole grains. · Do not smoke. If you need help quitting, talk to your doctor about stop-smoking programs and medicines. These can increase your chances of quitting for good. · If you drink alcohol, limit how much you drink. Limit alcohol to 2 drinks a day for men and 1 drink a day for women. When should you call for help? Call your doctor now or seek immediate medical care if: 
  · You have severe belly pain.  
  · Your stools are maroon or very bloody.  
 Watch closely for changes in your health, and be sure to contact your doctor if: 
  · You have a fever.  
  · You have nausea or vomiting.  
  · You have a change in bowel habits (new constipation or diarrhea).  
  · Your symptoms get worse or are not improving as expected. Where can you learn more? Go to http://allyssa-susan.info/. Enter 95 [id number] in the search box to learn more about \"Colon Polyps: Care Instructions. \" Current as of: May 12, 2017 Content Version: 11.7 © 3200-3296 eblizz. Care instructions adapted under license by [employer] (which disclaims liability or warranty for this information). If you have questions about a medical condition or this instruction, always ask your healthcare professional. [place] [address] any warranty or liability for your use of this information. Colonoscopy: What to Expect at [facility] Your Recovery After you have a colonoscopy, you will stay at the clinic for 1 to 2 hours until the medicines wear off. Then you can go home. But you will need to arrange for a ride. Your doctor will tell you when you can eat and do your other usual activities. Your doctor will talk to you about when you will need your next colonoscopy. Your doctor can help you decide how often you need to be checked. This will depend on the results of your test and your risk for colorectal cancer. After the test, you may be bloated or have gas pains.  You may need to pass gas. If a biopsy was done or a polyp was removed, you may have streaks of blood in your stool (feces) for a few days. This care sheet gives you a general idea about how long it will take for you to recover. But each person recovers at a different pace. Follow the steps below to get better as quickly as possible. How can you care for yourself at home? Activity · Rest when you feel tired. · You can do your normal activities when it feels okay to do so. Diet · Follow your doctor's directions for eating. · Unless your doctor has told you not to, drink plenty of fluids. This helps to replace the fluids that were lost during the colon prep. · Do not drink alcohol. Medicines · If polyps were removed or a biopsy was done during the test, your doctor may tell you not to take aspirin or other anti-inflammatory medicines for a few days. These include ibuprofen (Advil, Motrin) and naproxen (Aleve). Other instructions · For your safety, do not drive or operate machinery until the medicine wears off and you can think clearly. Your doctor may tell you not to drive or operate machinery until the day after your test. 
· Do not sign legal documents or make major decisions until the medicine wears off and you can think clearly. The anesthesia can make it hard for you to fully understand what you are agreeing to. Follow-up care is a key part of your treatment and safety. Be sure to make and go to all appointments, and call your doctor if you are having problems. It's also a good idea to know your test results and keep a list of the medicines you take. When should you call for help? Call 911 anytime you think you may need emergency care. For example, call if: 
· You passed out (lost consciousness). · You pass maroon or bloody stools. · You have severe belly pain. Call your doctor now or seek immediate medical care if: 
· Your stools are black and tarlike. · Your stools have streaks of blood, but you did not have a biopsy or any polyps removed. · You have belly pain, or your belly is swollen and firm. · You vomit. · You have a fever. · You are very dizzy. Watch closely for changes in your health, and be sure to contact your doctor if you have any problems. Where can you learn more? Go to Loudeye.be Enter E264 in the search box to learn more about \"Colonoscopy: What to Expect at Home. \"  
© 6783-0563 Healthwise, [employer]. Care instructions adapted under license by [employer] (which disclaims liability or warranty for this information). This care instruction is for use with your licensed healthcare professional. If you have questions about a medical condition or this instruction, always ask your healthcare professional. [place] [address] any warranty or liability for your use of this information. Content Version: 82.8.140544; Current as of: November 14, 2014 DISCHARGE SUMMARY from Nurse POST-PROCEDURE INSTRUCTIONS: 
 
Call your Physician if you: 
? Observe any excess bleeding. ? Develop a temperature over 100.5o F. 
? Experience abdominal, shoulder or chest pain. ? Notice any signs of decreased circulation or nerve impairment to an extremity such as a change in color, persistent numbness, tingling, coldness or increase in pain. ? Vomit blood or you have nausea and vomiting lasting longer than 4 hours. ? Are unable to take medications. ? Are unable to urinate within 8 hours after discharge following general anesthesia or intravenous sedation. For the next 24 hours after receiving general anesthesia or intravenous sedation, or while taking prescription Narcotics, limit your activities: 
? Do NOT drive a motor vehicle, operate hazard machinery or power tools, or perform tasks that require coordination. The medication you received during your procedure may have some effect on your mental awareness. ? Do NOT make important personal or business decisions. The medication you received during your procedure may have some effect on your mental awareness. ? Do NOT drink alcoholic beverages. These drinks do not mix well with the medications that have been given to you. ? Upon discharge from the hospital, you must be accompanied by a responsible adult. ? Resume your diet as directed by your physician. ? Resume medications as your physician has prescribed. ? Please give a list of your current medications to your Primary Care Provider. ? Please update this list whenever your medications are discontinued, doses are changed, or new medications (including over-the-counter products) are added. ? Please carry medication information at all times in case of emergency situations. These are general instructions for a healthy lifestyle: No smoking/ No tobacco products/ Avoid exposure to second hand smoke. ? Surgeon General's Warning:  Quitting smoking now greatly reduces serious risk to your health. Obesity, smoking, and a sedentary lifestyle greatly increase your risk for illness. ? A healthy diet, regular physical exercise & weight monitoring are important for maintaining a healthy lifestyle ? You may be retaining fluid if you have a history of heart failure or if you experience any of the following symptoms:  Weight gain of 3 pounds or more overnight or 5 pounds in a week, increased swelling in our hands or feet or shortness of breath while lying flat in bed. Please call your doctor as soon as you notice any of these symptoms; do not wait until your next office visit. Recognize signs and symptoms of STROKE: 
F  -  Face looks uneven A  -  Arms unable to move or move unevenly S  -  Speech slurred or non-existent T  -  Time to call 911 - as soon as signs and symptoms begin - DO NOT go back to bed or wait to see If you get better - TIME IS BRAIN. Colorectal Screening ? Colorectal cancer almost always develops from precancerous polyps (abnormal growths) in the colon or rectum. Screening tests can find precancerous polyps, so that they can be removed before they turn into cancer. Screening tests can also find colorectal cancer early, when treatment works best. 
? Speak with your physician about when you should begin screening and how often you should be tested. Additional Information If you have questions, please call [phone]. Remember, [employer]al[employer] is NOT to be used for urgent needs. For medical emergencies, dial 911. Educational references and/or instructions provided during this visit included: 
 
Colon Polyps APPOINTMENTS: 
 
FOLLOW UP VISIT Appointment in: Other (Specify) No aspirin or ibuprofen (e.g. Aleve, Motrin, Advil) for 5 days. Repeat colonoscopy in 3 years Discharge information has been reviewed with the patient. The patient verbalized understanding. Introducing Parker Nicolas As a [facility] patient, I wanted to make you aware of our electronic visit tool called Parker BenavidezProfig. [facility] 24/7 allows you to connect within minutes with a medical provider 24 hours a day, seven days a week via a mobile device or tablet or logging into a secure website from your computer. You can access Parker Benavidezfin from anywhere in the [place]. A virtual visit might be right for you when you have a simple condition and feel like you just dont want to get out of bed, or cant get away from work for an appointment, when your regular [facility] provider is not available (evenings, weekends or holidays), or when youre out of town and need minor care. Electronic visits cost only $49 and if the [facility] 24/7 provider determines a prescription is needed to treat your condition, one can be electronically transmitted to a nearby pharmacy*. Please take a moment to enroll today if you have not already done so.   The enrollment process is free and takes just a few minutes. To enroll, please download the [employer] 24/7 janny to your tablet or phone, or visit [email or web address] to enroll on your computer. And, as an [address] [place] [address] patient with a [employer] account, the results of your visits will be scanned into your electronic medical record and your primary care provider will be able to view the scanned results. We urge you to continue to see your regular [employer] provider for your ongoing medical care. And while your primary care provider may not be the one available when you seek a [facility] virtual visit, the peace of mind you get from getting a real diagnosis real time can be priceless. For more information on [facility], view our Frequently Asked Questions (FAQs) at [email or web address]. Sincerely, 
 
Lucy Sierra MD 
Chief Medical Officer [employer] *:  certain medications cannot be prescribed via [facility] Providers Seen During Your Hospitalization Provider Specialty Primary office phone Nathaly Little MD Colon and Rectal Surgery [phone] Your Primary Care Physician (PCP) Primary Care Physician Office Phone Office Fax [address], [address] [place] [address] [address] [phone] You are allergic to the following Allergen Reactions Latex Rash Contact Dermatitis Statins-Hmg-Coa Reductase Inhibitors Myalgia Recent Documentation Height Weight BMI OB Status Smoking Status 1.676 m 63.5 kg 22.6 kg/m2 Hysterectomy Former Smoker Emergency Contacts Name Discharge Info Relation Home Work Mobile Robby Espinoza DISCHARGE CAREGIVER [3] Spouse [3] [phone] Robby RUIZ  Spouse [3] [phone] Patient Belongings The following personal items are in your possession at time of discharge: 
  Dental Appliances: None  Visual Aid: None Please provide this summary of care documentation to your next provider. Signatures-by signing, you are acknowledging that this After Visit Summary has been reviewed with you and you have received a copy. Patient Signature:  ____________________________________________________________ Date:  ____________________________________________________________  
  
Cherelle Athol Provider Signature:  ____________________________________________________________ Date:  ____________________________________________________________

## 2018-08-08 NOTE — DISCHARGE INSTRUCTIONS
Colon Polyps: Care Instructions  Your Care Instructions    Colon polyps are growths in the colon or the rectum. The cause of most colon polyps is not known, and most people who get them do not have any problems. But a certain kind can turn into cancer. For this reason, regular testing for colon polyps is important for people age 48 and older and anyone who has an increased risk for colon cancer. Polyps are usually found through routine colon cancer screening tests. Although most colon polyps are not cancerous, they are usually removed and then tested for cancer. Screening for colon cancer saves lives because the cancer can usually be cured if it is caught early. If you have a polyp that is the type that can turn into cancer, you may need more tests to examine your entire colon. The doctor will remove any other polyps that he or she finds, and you will be tested more often. Follow-up care is a key part of your treatment and safety. Be sure to make and go to all appointments, and call your doctor if you are having problems. It's also a good idea to know your test results and keep a list of the medicines you take. How can you care for yourself at home? Regular exams to look for colon polyps are the best way to prevent polyps from turning into colon cancer. These can include stool tests, sigmoidoscopy, colonoscopy, and CT colonography. Talk with your doctor about a testing schedule that is right for you. To prevent polyps  There is no home treatment that can prevent colon polyps. But these steps may help lower your risk for cancer. · Stay active. Being active can help you get to and stay at a healthy weight. Try to exercise on most days of the week. Walking is a good choice. · Eat well. Choose a variety of vegetables, fruits, legumes (such as peas and beans), fish, poultry, and whole grains. · Do not smoke. If you need help quitting, talk to your doctor about stop-smoking programs and medicines.  These can increase your chances of quitting for good. · If you drink alcohol, limit how much you drink. Limit alcohol to 2 drinks a day for men and 1 drink a day for women. When should you call for help? Call your doctor now or seek immediate medical care if:    · You have severe belly pain.     · Your stools are maroon or very bloody.    Watch closely for changes in your health, and be sure to contact your doctor if:    · You have a fever.     · You have nausea or vomiting.     · You have a change in bowel habits (new constipation or diarrhea).     · Your symptoms get worse or are not improving as expected. Where can you learn more? Go to http://allyssa-susan.info/. Enter 95 [id number] in the search box to learn more about \"Colon Polyps: Care Instructions. \"  Current as of: May 12, 2017  Content Version: 11.7  © 4715-9310 [employer]. Care instructions adapted under license by [employer] (which disclaims liability or warranty for this information). If you have questions about a medical condition or this instruction, always ask your healthcare professional. [place] [address] any warranty or liability for your use of this information. Colonoscopy: What to Expect at [address]  After you have a colonoscopy, you will stay at the clinic for 1 to 2 hours until the medicines wear off. Then you can go home. But you will need to arrange for a ride. Your doctor will tell you when you can eat and do your other usual activities. Your doctor will talk to you about when you will need your next colonoscopy. Your doctor can help you decide how often you need to be checked. This will depend on the results of your test and your risk for colorectal cancer. After the test, you may be bloated or have gas pains. You may need to pass gas. If a biopsy was done or a polyp was removed, you may have streaks of blood in your stool (feces) for a few days.   This care sheet gives you a general idea about how long it will take for you to recover. But each person recovers at a different pace. Follow the steps below to get better as quickly as possible. How can you care for yourself at home? Activity  · Rest when you feel tired. · You can do your normal activities when it feels okay to do so. Diet  · Follow your doctor's directions for eating. · Unless your doctor has told you not to, drink plenty of fluids. This helps to replace the fluids that were lost during the colon prep. · Do not drink alcohol. Medicines  · If polyps were removed or a biopsy was done during the test, your doctor may tell you not to take aspirin or other anti-inflammatory medicines for a few days. These include ibuprofen (Advil, Motrin) and naproxen (Aleve). Other instructions  · For your safety, do not drive or operate machinery until the medicine wears off and you can think clearly. Your doctor may tell you not to drive or operate machinery until the day after your test.  · Do not sign legal documents or make major decisions until the medicine wears off and you can think clearly. The anesthesia can make it hard for you to fully understand what you are agreeing to. Follow-up care is a key part of your treatment and safety. Be sure to make and go to all appointments, and call your doctor if you are having problems. It's also a good idea to know your test results and keep a list of the medicines you take. When should you call for help? Call 911 anytime you think you may need emergency care. For example, call if:  · You passed out (lost consciousness). · You pass maroon or bloody stools. · You have severe belly pain. Call your doctor now or seek immediate medical care if:  · Your stools are black and tarlike. · Your stools have streaks of blood, but you did not have a biopsy or any polyps removed. · You have belly pain, or your belly is swollen and firm. · You vomit. · You have a fever.   · You are very dizzy. Watch closely for changes in your health, and be sure to contact your doctor if you have any problems. Where can you learn more? Go to [employer]  Enter E264 in the search box to learn more about \"Colonoscopy: What to Expect at Home. \"   © 2564-6589 [employer]. Care instructions adapted under license by [employer] (which disclaims liability or warranty for this information). This care instruction is for use with your licensed healthcare professional. If you have questions about a medical condition or this instruction, always ask your healthcare professional. [place] [address] any warranty or liability for your use of this information. Content Version: 33.7.100565; Current as of: November 14, 2014      DISCHARGE SUMMARY from Nurse     POST-PROCEDURE INSTRUCTIONS:    Call your Physician if you:  ? Observe any excess bleeding. ? Develop a temperature over 100.5o F.  ? Experience abdominal, shoulder or chest pain. ? Notice any signs of decreased circulation or nerve impairment to an extremity such as a change in color, persistent numbness, tingling, coldness or increase in pain. ? Vomit blood or you have nausea and vomiting lasting longer than 4 hours. ? Are unable to take medications. ? Are unable to urinate within 8 hours after discharge following general anesthesia or intravenous sedation. For the next 24 hours after receiving general anesthesia or intravenous sedation, or while taking prescription Narcotics, limit your activities:  ? Do NOT drive a motor vehicle, operate hazard machinery or power tools, or perform tasks that require coordination. The medication you received during your procedure may have some effect on your mental awareness. ? Do NOT make important personal or business decisions. The medication you received during your procedure may have some effect on your mental awareness. ? Do NOT drink alcoholic beverages.   These drinks do not mix well with the medications that have been given to you. ? Upon discharge from the hospital, you must be accompanied by a responsible adult. ? Resume your diet as directed by your physician. ? Resume medications as your physician has prescribed. ? Please give a list of your current medications to your Primary Care Provider. ? Please update this list whenever your medications are discontinued, doses are changed, or new medications (including over-the-counter products) are added. ? Please carry medication information at all times in case of emergency situations. These are general instructions for a healthy lifestyle:    No smoking/ No tobacco products/ Avoid exposure to second hand smoke.  Surgeon General's Warning:  Quitting smoking now greatly reduces serious risk to your health. Obesity, smoking, and a sedentary lifestyle greatly increase your risk for illness.  A healthy diet, regular physical exercise & weight monitoring are important for maintaining a healthy lifestyle   You may be retaining fluid if you have a history of heart failure or if you experience any of the following symptoms:  Weight gain of 3 pounds or more overnight or 5 pounds in a week, increased swelling in our hands or feet or shortness of breath while lying flat in bed. Please call your doctor as soon as you notice any of these symptoms; do not wait until your next office visit. Recognize signs and symptoms of STROKE:  F  -  Face looks uneven  A  -  Arms unable to move or move unevenly  S  -  Speech slurred or non-existent  T  -  Time to call 911 - as soon as signs and symptoms begin - DO NOT go back to bed or wait to see If you get better - TIME IS BRAIN. Colorectal Screening   Colorectal cancer almost always develops from precancerous polyps (abnormal growths) in the colon or rectum. Screening tests can find precancerous polyps, so that they can be removed before they turn into cancer. Screening tests can also find colorectal cancer early, when treatment works best.  Reinaldo Reveles Speak with your physician about when you should begin screening and how often you should be tested. Additional Information    If you have questions, please call [phone]. Remember, MyChart is NOT to be used for urgent needs. For medical emergencies, dial 911. Educational references and/or instructions provided during this visit included:    Colon Polyps      APPOINTMENTS:    FOLLOW UP VISIT Appointment in: Other (Specify) No aspirin or ibuprofen (e.g. Aleve, Motrin, Advil) for 5 days. Repeat colonoscopy in 3 years    Discharge information has been reviewed with the patient. The patient verbalized understanding.

## 2018-08-08 NOTE — ANESTHESIA PREPROCEDURE EVALUATION
Anesthetic History   No history of anesthetic complications            Review of Systems / Medical History  Patient summary reviewed and pertinent labs reviewed    Pulmonary  Within defined limits                 Neuro/Psych         Psychiatric history     Cardiovascular                  Exercise tolerance: <4 METS     GI/Hepatic/Renal  Within defined limits              Endo/Other  Within defined limits           Other Findings   Comments: Documentation of current medication  Current medications obtained, documented and obtained? YES      Risk Factors for Postoperative nausea/vomiting:       History of postoperative nausea/vomiting? NO       Female? YES       Motion sickness? NO       Intended opioid administration for postoperative analgesia? NO      Smoking Abstinence:  Current Smoker? NO  Elective Surgery? YES  Seen preoperatively by anesthesiologist or proxy prior to day of surgery? YES  Pt abstained from smoking 24 hours prior to anesthesia?  N/A    Preventive care/screening for High Blood Pressure:  Aged 18 years and older: YES  Screened for high blood pressure: YES  Patients with high blood pressure referred to primary care provider   for BP management: YES                 Physical Exam    Airway  Mallampati: II  TM Distance: 4 - 6 cm  Neck ROM: normal range of motion   Mouth opening: Normal     Cardiovascular  Regular rate and rhythm,  S1 and S2 normal,  no murmur, click, rub, or gallop             Dental  No notable dental hx       Pulmonary  Breath sounds clear to auscultation               Abdominal  GI exam deferred       Other Findings            Anesthetic Plan    ASA: 2  Anesthesia type: MAC          Induction: Intravenous  Anesthetic plan and risks discussed with: Patient

## 2018-08-08 NOTE — INTERVAL H&P NOTE
H&P Update:  Freda Cook was seen and examined. History and physical has been reviewed. The patient has been examined.  There have been no significant clinical changes since the completion of the originally dated History and Physical.    Signed By: Billie Saunders MD     August 8, 2018 1:56 PM

## 2018-08-15 ENCOUNTER — TELEPHONE (OUTPATIENT)
Dept: SURGERY | Age: 63
End: 2018-08-15

## 2018-08-15 NOTE — TELEPHONE ENCOUNTER
----- Message from Tabitha Goodell, MD sent at 8/13/2018 10:16 AM EDT -----  Benign polyp(s). Repeat colonoscopy in 3 years as planned. Notified patient of pathology results. [facility] in [place] for 3 years. Patient understands.

## 2018-10-03 ENCOUNTER — TELEPHONE (OUTPATIENT)
Dept: VASCULAR SURGERY | Age: 63
End: 2018-10-03

## 2018-10-03 ENCOUNTER — DOCUMENTATION ONLY (OUTPATIENT)
Dept: VASCULAR SURGERY | Age: 63
End: 2018-10-03

## 2018-10-03 DIAGNOSIS — R60.0 LEG EDEMA, LEFT: Primary | ICD-10-CM

## 2018-10-03 NOTE — TELEPHONE ENCOUNTER
I spoke with patient  States swelling approx 1 week.  No right leg symptoms  No enticing factors  Heaving and just difficult to walk  Will try to arrange for stat pvl to evaluate

## 2018-10-03 NOTE — TELEPHONE ENCOUNTER
Attempted to call all the numbers we have for patient and there is no answer and left messages at each number to return call.

## 2018-10-03 NOTE — PROGRESS NOTES
Patient had called in this morning with complaints of left leg swelling and pain. We brought her in for PVL, and I was able to talk to her after the results, and she was negative for any DVT. The tech also evaluated the saphenous vein because she had previous closure and that is still effective. No other significant areas of reflux notable. No obvious joint effusion at the knee level. Today she does not appear very swollen but I can tell she is favoring that leg when she does ambulate. She is mostly complaining of the ankle area. This could be some other joint inflammation. I have encouraged her to use ice and elevation, anti-inflammatories as able, and follow-up with primary care. This may be something orthopedic in nature that they can evaluate or refer otherwise

## 2019-01-15 RX ORDER — PROPRANOLOL HYDROCHLORIDE 80 MG/1
CAPSULE, EXTENDED RELEASE ORAL
Qty: 90 CAP | Refills: 3 | Status: SHIPPED | OUTPATIENT
Start: 2019-01-15 | End: 2019-12-11 | Stop reason: SDUPTHER

## 2019-07-01 ENCOUNTER — HOSPITAL ENCOUNTER (OUTPATIENT)
Dept: LAB | Age: 64
Discharge: HOME | End: 2019-07-01
Payer: COMMERCIAL

## 2019-07-01 ENCOUNTER — OFFICE VISIT (OUTPATIENT)
Dept: FAMILY MEDICINE CLINIC | Facility: CLINIC | Age: 64
End: 2019-07-01

## 2019-07-01 VITALS
WEIGHT: 151.8 LBS | BODY MASS INDEX: 24.4 KG/M2 | SYSTOLIC BLOOD PRESSURE: 121 MMHG | HEART RATE: 52 BPM | OXYGEN SATURATION: 99 % | DIASTOLIC BLOOD PRESSURE: 77 MMHG | TEMPERATURE: 96.3 F | HEIGHT: 66 IN

## 2019-07-01 DIAGNOSIS — Z00.00 ANNUAL PHYSICAL EXAM: Primary | ICD-10-CM

## 2019-07-01 DIAGNOSIS — I83.812 VARICOSE VEINS OF LEG WITH PAIN, LEFT: ICD-10-CM

## 2019-07-01 DIAGNOSIS — Z78.0 MENOPAUSE: ICD-10-CM

## 2019-07-01 DIAGNOSIS — R00.2 PALPITATIONS: ICD-10-CM

## 2019-07-01 DIAGNOSIS — F41.9 ANXIETY: ICD-10-CM

## 2019-07-01 DIAGNOSIS — Z12.31 ENCOUNTER FOR SCREENING MAMMOGRAM FOR BREAST CANCER: ICD-10-CM

## 2019-07-01 DIAGNOSIS — Z00.00 ANNUAL PHYSICAL EXAM: ICD-10-CM

## 2019-07-01 LAB
ALBUMIN SERPL-MCNC: 4.1 G/DL (ref 3.4–5)
ALBUMIN/GLOB SERPL: 1.5 {RATIO} (ref 0.8–1.7)
ALP SERPL-CCNC: 83 U/L (ref 45–117)
ALT SERPL-CCNC: 19 U/L (ref 13–56)
ANION GAP SERPL CALC-SCNC: 9 MMOL/L (ref 3–18)
AST SERPL-CCNC: 13 U/L (ref 15–37)
BASOPHILS # BLD: 0.1 K/UL (ref 0–0.1)
BASOPHILS NFR BLD: 1 % (ref 0–2)
BILIRUB SERPL-MCNC: 0.4 MG/DL (ref 0.2–1)
BUN SERPL-MCNC: 12 MG/DL (ref 7–18)
BUN/CREAT SERPL: 13 (ref 12–20)
CALCIUM SERPL-MCNC: 8.7 MG/DL (ref 8.5–10.1)
CHLORIDE SERPL-SCNC: 98 MMOL/L (ref 100–108)
CHOLEST SERPL-MCNC: 273 MG/DL
CO2 SERPL-SCNC: 29 MMOL/L (ref 21–32)
CREAT SERPL-MCNC: 0.89 MG/DL (ref 0.6–1.3)
DIFFERENTIAL METHOD BLD: ABNORMAL
EOSINOPHIL # BLD: 0.6 K/UL (ref 0–0.4)
EOSINOPHIL NFR BLD: 9 % (ref 0–5)
ERYTHROCYTE [DISTWIDTH] IN BLOOD BY AUTOMATED COUNT: 13.5 % (ref 11.6–14.5)
GLOBULIN SER CALC-MCNC: 2.7 G/DL (ref 2–4)
GLUCOSE SERPL-MCNC: 81 MG/DL (ref 74–99)
HCT VFR BLD AUTO: 39.2 % (ref 35–45)
HDLC SERPL-MCNC: 65 MG/DL (ref 40–60)
HDLC SERPL: 4.2 {RATIO} (ref 0–5)
HGB BLD-MCNC: 13 G/DL (ref 12–16)
LDLC SERPL CALC-MCNC: 179 MG/DL (ref 0–100)
LIPID PROFILE,FLP: ABNORMAL
LYMPHOCYTES # BLD: 2.7 K/UL (ref 0.9–3.6)
LYMPHOCYTES NFR BLD: 42 % (ref 21–52)
MCH RBC QN AUTO: 29.5 PG (ref 24–34)
MCHC RBC AUTO-ENTMCNC: 33.2 G/DL (ref 31–37)
MCV RBC AUTO: 89.1 FL (ref 74–97)
MONOCYTES # BLD: 0.4 K/UL (ref 0.05–1.2)
MONOCYTES NFR BLD: 5 % (ref 3–10)
NEUTS SEG # BLD: 2.8 K/UL (ref 1.8–8)
NEUTS SEG NFR BLD: 43 % (ref 40–73)
PLATELET # BLD AUTO: 273 K/UL (ref 135–420)
PMV BLD AUTO: 11.9 FL (ref 9.2–11.8)
POTASSIUM SERPL-SCNC: 4.3 MMOL/L (ref 3.5–5.5)
PROT SERPL-MCNC: 6.8 G/DL (ref 6.4–8.2)
RBC # BLD AUTO: 4.4 M/UL (ref 4.2–5.3)
SODIUM SERPL-SCNC: 136 MMOL/L (ref 136–145)
TRIGL SERPL-MCNC: 145 MG/DL (ref ?–150)
VLDLC SERPL CALC-MCNC: 29 MG/DL
WBC # BLD AUTO: 6.5 K/UL (ref 4.6–13.2)

## 2019-07-01 PROCEDURE — 80053 COMPREHEN METABOLIC PANEL: CPT

## 2019-07-01 PROCEDURE — 80061 LIPID PANEL: CPT

## 2019-07-01 PROCEDURE — 36415 COLL VENOUS BLD VENIPUNCTURE: CPT

## 2019-07-01 PROCEDURE — 85025 COMPLETE CBC W/AUTO DIFF WBC: CPT

## 2019-07-01 NOTE — PROGRESS NOTES
Zachery Carballo presents today for   Chief Complaint   Patient presents with    Well Woman       Zachery Carballo preferred language for health care discussion is [address] Is someone accompanying this pt? no    Is the patient using any DME equipment during OV? no    Depression Screening:  No flowsheet data found. Learning Assessment:  Learning Assessment 2/22/2018   PRIMARY LEARNER Patient   BARRIERS PRIMARY LEARNER -   CO-LEARNER CAREGIVER -   PRIMARY LANGUAGE ENGLISH   LEARNER PREFERENCE PRIMARY LISTENING     -   ANSWERED BY patient   RELATIONSHIP SELF       Abuse Screening:  No flowsheet data found. Health Maintenance reviewed and discussed and ordered per Provider. Health Maintenance Due   Topic Date Due    Hepatitis C Screening  1955    DTaP/Tdap/Td series (1 - Tdap) 11/29/1976    PAP AKA CERVICAL CYTOLOGY  11/29/1976    Shingrix Vaccine Age 50> (1 of 2) 11/29/2005    BREAST CANCER SCRN MAMMOGRAM  05/05/2019   . Zachery Carballo is updated on all     Pt currently taking Antiplatelet therapy? no    Coordination of Care:  1. Have you been to the ER, urgent care clinic since your last visit? no  Hospitalized since your last visit? no    2. Have you seen or consulted any other health care providers outside of the [address] [place] [address] since your last visit? no Include any pap smears or colon screening.  no

## 2019-07-01 NOTE — PROGRESS NOTES
The patient presents to the office today with the chief complaint of anxiety    HPI    Patient remains on Prozac and Klonopin for chronic anxiety. She is doing well on the medications. The patient has intermittent palpitations which are worse when she has episodes of anxiety. The patient remains on Inderal for this. This is controlling the palpitations well. Patient has a history of varicose veins with chronic lower extremity pain and swelling. Vascular work-up was negative. The problem still continues. ROS  Positive for anxiety, peptic dictations, and extremity edema  Negative for chest pain or shortness of breath    Allergies   Allergen Reactions    Latex Rash and Contact Dermatitis    Statins-Hmg-Coa Reductase Inhibitors Myalgia       Current Outpatient Medications   Medication Sig Dispense Refill    propranolol LA (INDERAL LA) 80 mg SR capsule TAKE ONE CAPSULE BY MOUTH ONCE DAILY 90 Cap 3    fluticasone (FLONASE) 50 mcg/actuation nasal spray 2 spray each nostril daily 1 Bottle 1    escitalopram (LEXAPRO) 20 mg tablet Take 20 mg by mouth daily.          Past Medical History:   Diagnosis Date    Benign hematuria     Depressive disorder     Dermatitis     Palpitations     Pure hypercholesterolemia        Past Surgical History:   Procedure Laterality Date    COLONOSCOPY N/A 8/8/2018    COLONOSCOPY / polypectomy performed by Ephraim Mcnally MD at [facility] ENDOSCOPY    HX HYSTERECTOMY      age 45       Social History     Socioeconomic History    Marital status:      Spouse name: Not on file    Number of children: Not on file    Years of education: Not on file    Highest education level: Not on file   Occupational History    Not on file   Social Needs    Financial resource strain: Not on file    Food insecurity:     Worry: Not on file     Inability: Not on file    Transportation needs:     Medical: Not on file     Non-medical: Not on file   Tobacco Use    Smoking status: Former Smoker     Last attempt to quit: 1994     Years since quittin.4    Smokeless tobacco: Never Used   Substance and Sexual Activity    Alcohol use: Yes     Comment: occasional    Drug use: Not on file    Sexual activity: Not on file   Lifestyle    Physical activity:     Days per week: Not on file     Minutes per session: Not on file    Stress: Not on file   Relationships    Social connections:     Talks on phone: Not on file     Gets together: Not on file     Attends Buddhism service: Not on file     Active member of club or organization: Not on file     Attends meetings of clubs or organizations: Not on file     Relationship status: Not on file    Intimate partner violence:     Fear of current or ex partner: Not on file     Emotionally abused: Not on file     Physically abused: Not on file     Forced sexual activity: Not on file   Other Topics Concern    Not on file   Social History Narrative    Not on file       Patient does not have an advanced directive on file    Visit Vitals  /77 (BP 1 Location: Right arm, BP Patient Position: Sitting)   Pulse (!) 52   Temp 96.3 °F (35.7 °C) (Tympanic)   Ht 5' 6\" (1.676 m)   Wt 151 lb 12.8 oz (68.9 kg)   SpO2 99%   BMI 24.50 kg/m²       Physical Exam  No Cervical Lymphadenopathy  No Supraclavicular Lymphadenopathy  Thyroid is Normal  Lungs are normal to percussion. Clear to auscultation   Heart:  S1 S2 are normal, No gallops, No murmurs  No Carotid Bruits  Abdomen:  Normal Bowel Sounds. No tenderness. No masses. No Hepatomegaly or Splenomegaly  LE:  Strong Pedal Pulses.   No Edema      BMI: [facility] Outpatient Visit on 2019   Component Date Value Ref Range Status    WBC 2019 6.5  4.6 - 13.2 K/uL Final    RBC 2019 4.40  4.20 - 5.30 M/uL Final    HGB 2019 13.0  12.0 - 16.0 g/dL Final    HCT 2019 39.2  35.0 - 45.0 % Final    MCV 2019 89.1  74.0 - 97.0 FL Final    MCH 2019 29.5  24.0 - 34.0 PG Final  MCHC 07/01/2019 33.2  31.0 - 37.0 g/dL Final    RDW 07/01/2019 13.5  11.6 - 14.5 % Final    PLATELET 93/17/8168 215  135 - 420 K/uL Final    MPV 07/01/2019 11.9* 9.2 - 11.8 FL Final    NEUTROPHILS 07/01/2019 43  40 - 73 % Final    LYMPHOCYTES 07/01/2019 42  21 - 52 % Final    MONOCYTES 07/01/2019 5  3 - 10 % Final    EOSINOPHILS 07/01/2019 9* 0 - 5 % Final    BASOPHILS 07/01/2019 1  0 - 2 % Final    ABS. NEUTROPHILS 07/01/2019 2.8  1.8 - 8.0 K/UL Final    ABS. LYMPHOCYTES 07/01/2019 2.7  0.9 - 3.6 K/UL Final    ABS. MONOCYTES 07/01/2019 0.4  0.05 - 1.2 K/UL Final    ABS. EOSINOPHILS 07/01/2019 0.6* 0.0 - 0.4 K/UL Final    ABS. BASOPHILS 07/01/2019 0.1  0.0 - 0.1 K/UL Final    DF 07/01/2019 AUTOMATED    Final    Sodium 07/01/2019 136  136 - 145 mmol/L Final    Potassium 07/01/2019 4.3  3.5 - 5.5 mmol/L Final    Chloride 07/01/2019 98* 100 - 108 mmol/L Final    CO2 07/01/2019 29  21 - 32 mmol/L Final    Anion gap 07/01/2019 9  3.0 - 18 mmol/L Final    Glucose 07/01/2019 81  74 - 99 mg/dL Final    BUN 07/01/2019 12  7.0 - 18 MG/DL Final    Creatinine 07/01/2019 0.89  0.6 - 1.3 MG/DL Final    BUN/Creatinine ratio 07/01/2019 13  12 - 20   Final    GFR est AA 07/01/2019 >60  >60 ml/min/1.73m2 Final    GFR est non-AA 07/01/2019 >60  >60 ml/min/1.73m2 Final    Comment: (NOTE)  Estimated GFR is calculated using the Modification of Diet in Renal   Disease (MDRD) Study equation, reported for both  Americans   (GFRAA) and non- Americans (GFRNA), and normalized to 1.73m2   body surface area. The physician must decide which value applies to   the patient. The MDRD study equation should only be used in   individuals age 25 or older. It has not been validated for the   following: pregnant women, patients with serious comorbid conditions,   or on certain medications, or persons with extremes of body size,   muscle mass, or nutritional status.       Calcium 07/01/2019 8.7  8.5 - 10.1 MG/DL Final    Bilirubin, total 07/01/2019 0.4  0.2 - 1.0 MG/DL Final    ALT (SGPT) 07/01/2019 19  13 - 56 U/L Final    AST (SGOT) 07/01/2019 13* 15 - 37 U/L Final    Alk. phosphatase 07/01/2019 83  45 - 117 U/L Final    Protein, total 07/01/2019 6.8  6.4 - 8.2 g/dL Final    Albumin 07/01/2019 4.1  3.4 - 5.0 g/dL Final    Globulin 07/01/2019 2.7  2.0 - 4.0 g/dL Final    A-G Ratio 07/01/2019 1.5  0.8 - 1.7   Final    LIPID PROFILE 07/01/2019        Final    Cholesterol, total 07/01/2019 273* <200 MG/DL Final    Triglyceride 07/01/2019 145  <150 MG/DL Final    Comment: The drugs N-acetylcysteine (NAC) and  Metamiszole have been found to cause falsely  low results in this chemical assay. Please  be sure to submit blood samples obtained  BEFORE administration of either of these  drugs to assure correct results.  HDL Cholesterol 07/01/2019 65* 40 - 60 MG/DL Final    LDL, calculated 07/01/2019 179* 0 - 100 MG/DL Final    VLDL, calculated 07/01/2019 29  MG/DL Final    CHOL/HDL Ratio 07/01/2019 4.2  0 - 5.0   Final       .  Results for orders placed or performed during the hospital encounter of 07/01/19   CBC WITH AUTOMATED DIFF   Result Value Ref Range    WBC 6.5 4.6 - 13.2 K/uL    RBC 4.40 4.20 - 5.30 M/uL    HGB 13.0 12.0 - 16.0 g/dL    HCT 39.2 35.0 - 45.0 %    MCV 89.1 74.0 - 97.0 FL    MCH 29.5 24.0 - 34.0 PG    MCHC 33.2 31.0 - 37.0 g/dL    RDW 13.5 11.6 - 14.5 %    PLATELET 290 254 - 125 K/uL    MPV 11.9 (H) 9.2 - 11.8 FL    NEUTROPHILS 43 40 - 73 %    LYMPHOCYTES 42 21 - 52 %    MONOCYTES 5 3 - 10 %    EOSINOPHILS 9 (H) 0 - 5 %    BASOPHILS 1 0 - 2 %    ABS. NEUTROPHILS 2.8 1.8 - 8.0 K/UL    ABS. LYMPHOCYTES 2.7 0.9 - 3.6 K/UL    ABS. MONOCYTES 0.4 0.05 - 1.2 K/UL    ABS. EOSINOPHILS 0.6 (H) 0.0 - 0.4 K/UL    ABS.  BASOPHILS 0.1 0.0 - 0.1 K/UL    DF AUTOMATED     METABOLIC PANEL, COMPREHENSIVE   Result Value Ref Range    Sodium 136 136 - 145 mmol/L    Potassium 4.3 3.5 - 5.5 mmol/L    Chloride 98 (L) 100 - 108 mmol/L    CO2 29 21 - 32 mmol/L    Anion gap 9 3.0 - 18 mmol/L    Glucose 81 74 - 99 mg/dL    BUN 12 7.0 - 18 MG/DL    Creatinine 0.89 0.6 - 1.3 MG/DL    BUN/Creatinine ratio 13 12 - 20      GFR est AA >60 >60 ml/min/1.73m2    GFR est non-AA >60 >60 ml/min/1.73m2    Calcium 8.7 8.5 - 10.1 MG/DL    Bilirubin, total 0.4 0.2 - 1.0 MG/DL    ALT (SGPT) 19 13 - 56 U/L    AST (SGOT) 13 (L) 15 - 37 U/L    Alk. phosphatase 83 45 - 117 U/L    Protein, total 6.8 6.4 - 8.2 g/dL    Albumin 4.1 3.4 - 5.0 g/dL    Globulin 2.7 2.0 - 4.0 g/dL    A-G Ratio 1.5 0.8 - 1.7     LIPID PANEL   Result Value Ref Range    LIPID PROFILE          Cholesterol, total 273 (H) <200 MG/DL    Triglyceride 145 <150 MG/DL    HDL Cholesterol 65 (H) 40 - 60 MG/DL    LDL, calculated 179 (H) 0 - 100 MG/DL    VLDL, calculated 29 MG/DL    CHOL/HDL Ratio 4.2 0 - 5.0         Assessment / Plan      ICD-10-CM ICD-9-CM    1. Annual physical exam Z00.00 V70.0 CBC WITH AUTOMATED DIFF      METABOLIC PANEL, COMPREHENSIVE      LIPID PANEL   2. Anxiety F41.9 300.00    3. Palpitations R00.2 785.1    4. Varicose veins of leg with pain, left I83.812 454.8    5. Menopause Z78.0 627.2 DEXA BONE DENSITY STUDY AXIAL   6. Encounter for screening mammogram for breast cancer Z12.31 V76.12 [facility] MAMMO BI SCREENING INCL CAD       she was advised to continue her maintenance medications  The patient was given an order for a screening mammogram  An order was placed for a DEXA scan  I CBC and a conference of metabolic panel were ordered    Follow-up and Dispositions    · Return in about 4 months (around 11/4/2019). I asked Cliff Corley if she has any questions and I answered the questions. Leonetta Stalling. Ami Pedlar states that she understands the treatment plan and agrees with the treatment plan          THIS DOCUMENT WAS CREATED WITH A VOICE ACTIVATED DICTATION SYSTEM.   IT MAY CONTAIN TRANSCRIPTION ERRORS

## 2019-11-19 NOTE — MR AVS SNAPSHOT
Please advise on TSH labs from physical on 11/12/19:    TSH   Date Value Ref Range Status   11/12/2019 0.12 (L) 0.40 - 4.00 mU/L Final     Linda Mcclellan RN, BSN, PHN     Visit Information Date & Time Provider Department Dept. Phone Encounter #  
 12/5/2017 11:30 AM Jacob Spears, [address] [facility] [phone] Your Appointments 12/7/2017 10:00 AM  
PROCEDURE with [facility] ([address]) Appt Note: perform reflux/ knaak [address] [facility] [address] [place] [address]  
[phone] [address] [place] [address]  
  
    
 6/20/2018 10:30 AM  
Follow Up with Jina Ortiz MD  
[facility] [place] [address] [facility]) Appt Note: 7 month [address], [address]  
  
   
 [address], [address] [facility] [address] Upcoming Health Maintenance Date Due Hepatitis C Screening 1955 DTaP/Tdap/Td series (1 - Tdap) 11/29/1976 PAP AKA CERVICAL CYTOLOGY 11/29/1976 ZOSTER VACCINE AGE 60> 9/29/2015 Influenza Age 5 to Adult 8/1/2017 BREAST CANCER SCRN MAMMOGRAM 5/5/2019 COLONOSCOPY 10/9/2019 Allergies as of 12/5/2017  Review Complete On: 12/5/2017 By: Marlen Aguilar Severity Noted Reaction Type Reactions Latex  01/28/2014    Rash, Contact Dermatitis Statins-hmg-coa Reductase Inhibitors    Myalgia Current Immunizations  Reviewed on 12/15/2016 Name Date Influenza Vaccine (Quad) PF 12/15/2016, 1/28/2016 Not reviewed this visit You Were Diagnosed With   
  
 Codes Comments Painful veins    -  Primary ICD-10-CM: R09.89 ICD-9-CM: 785.9 Pain of left lower extremity     ICD-10-CM: M79.605 ICD-9-CM: 729.5 Leg swelling     ICD-10-CM: M79.89 ICD-9-CM: 729.81 Vitals BP Resp Height(growth percentile) Weight(growth percentile) BMI OB Status (!) 166/94 (BP 1 Location: Right arm, BP Patient Position: Sitting) 11 5' 6\" (1.676 m) 156 lb (70.8 kg) 25.18 kg/m2 Hysterectomy Smoking Status Former Smoker Vitals History BMI and BSA Data Body Mass Index Body Surface Area  
 25.18 kg/m 2 1.82 m 2 Preferred Pharmacy Pharmacy Name Phone [facility] [address] [place], [address] [place] [address] [phone] Your Updated Medication List  
  
   
This list is accurate as of: 12/5/17 12:50 PM.  Always use your most recent med list.  
  
  
  
  
 clonazePAM 1 mg tablet Commonly known as:  Yu Slay Take 1 Tab by mouth nightly. fluticasone 50 mcg/actuation nasal spray Commonly known as:  FLONASE  
2 spray each nostril daily LEXAPRO 20 mg tablet Generic drug:  escitalopram oxalate Take 20 mg by mouth daily. propranolol LA 80 mg SR capsule Commonly known as:  INDERAL LA Take 1 Cap by mouth daily. To-Do List   
 12/07/2017 Imaging:  DUPLEX LOWER EXT VENOUS LEFT AMB Please provide this summary of care documentation to your next provider. Your primary care clinician is listed as Suraj Best. If you have any questions after today's visit, please call [phone].

## 2019-12-11 RX ORDER — PROPRANOLOL HYDROCHLORIDE 80 MG/1
CAPSULE, EXTENDED RELEASE ORAL
Qty: 90 CAP | Refills: 3 | Status: SHIPPED | OUTPATIENT
Start: 2019-12-11 | End: 2020-10-01 | Stop reason: SDUPTHER

## 2020-03-10 ENCOUNTER — OFFICE VISIT (OUTPATIENT)
Dept: FAMILY MEDICINE CLINIC | Facility: CLINIC | Age: 65
End: 2020-03-10

## 2020-03-10 VITALS
SYSTOLIC BLOOD PRESSURE: 111 MMHG | HEIGHT: 66 IN | WEIGHT: 164 LBS | OXYGEN SATURATION: 94 % | RESPIRATION RATE: 20 BRPM | BODY MASS INDEX: 26.36 KG/M2 | DIASTOLIC BLOOD PRESSURE: 71 MMHG | TEMPERATURE: 97.2 F | HEART RATE: 61 BPM

## 2020-03-10 DIAGNOSIS — Z12.39 BREAST CANCER SCREENING: ICD-10-CM

## 2020-03-10 DIAGNOSIS — R00.2 PALPITATIONS: ICD-10-CM

## 2020-03-10 DIAGNOSIS — E78.2 MIXED HYPERLIPIDEMIA: Primary | ICD-10-CM

## 2020-03-10 DIAGNOSIS — Z11.59 ENCOUNTER FOR HEPATITIS C SCREENING TEST FOR LOW RISK PATIENT: ICD-10-CM

## 2020-03-10 DIAGNOSIS — F41.9 ANXIETY: ICD-10-CM

## 2020-03-10 NOTE — PROGRESS NOTES
Chief Complaint   Patient presents with    Establish Care     Hx of Anxiety Varicose Veins pt is requesting a referral for a mammogram

## 2020-03-10 NOTE — PROGRESS NOTES
Renita Opitz is a 59 y.o. female presenting today for Establish Care (Hx of Anxiety Varicose Veins pt is requesting a referral for a mammogram )  . HPI:  Renita Opitz presents to the office today  to establish care with a new provider. She is a previous patient of Dr. Blanquita Frias has a past medical history for anxiety depression, and palpitations. She presents today noting she feels well. She denies any chest pain, palpitation or lower extremity edema. She notes she feels well. She takes medication for palpitation and depression only. She is requesting a prescription for a mammogram.     Review of Systems   Respiratory: Negative for cough and sputum production. Cardiovascular: Negative for chest pain and palpitations. Gastrointestinal: Negative for abdominal pain, nausea and vomiting. Musculoskeletal: Negative for back pain and myalgias. Neurological: Negative for dizziness and headaches. Allergies   Allergen Reactions    Latex Rash and Contact Dermatitis    Statins-Hmg-Coa Reductase Inhibitors Myalgia       Current Outpatient Medications   Medication Sig Dispense Refill    propranolol LA (INDERAL LA) 80 mg SR capsule TAKE 1 CAPSULE BY MOUTH ONCE DAILY 90 Cap 3    fluticasone (FLONASE) 50 mcg/actuation nasal spray 2 spray each nostril daily 1 Bottle 1    escitalopram (LEXAPRO) 20 mg tablet Take 20 mg by mouth daily.          Past Medical History:   Diagnosis Date    Benign hematuria     Depressive disorder     Dermatitis     Palpitations     Pure hypercholesterolemia        Past Surgical History:   Procedure Laterality Date    COLONOSCOPY N/A 8/8/2018    COLONOSCOPY / polypectomy performed by Nick Simon MD at [facility] ENDOSCOPY    HX HYSTERECTOMY      age 45       Social History     Socioeconomic History    Marital status:      Spouse name: Not on file    Number of children: Not on file    Years of education: Not on file    Highest education level: Not on file Occupational History    Not on file   Social Needs    Financial resource strain: Not on file    Food insecurity:     Worry: Not on file     Inability: Not on file    Transportation needs:     Medical: Not on file     Non-medical: Not on file   Tobacco Use    Smoking status: Former Smoker     Last attempt to quit: 1994     Years since quittin.1    Smokeless tobacco: Never Used   Substance and Sexual Activity    Alcohol use: Yes     Comment: occasional    Drug use: Not on file    Sexual activity: Not on file   Lifestyle    Physical activity:     Days per week: Not on file     Minutes per session: Not on file    Stress: Not on file   Relationships    Social connections:     Talks on phone: Not on file     Gets together: Not on file     Attends Religion service: Not on file     Active member of club or organization: Not on file     Attends meetings of clubs or organizations: Not on file     Relationship status: Not on file    Intimate partner violence:     Fear of current or ex partner: Not on file     Emotionally abused: Not on file     Physically abused: Not on file     Forced sexual activity: Not on file   Other Topics Concern    Not on file   Social History Narrative    Not on file       Patient does not have an advanced directive on file    Vitals:    03/10/20 1343   BP: 111/71   Pulse: 61   Resp: 20   Temp: 97.2 °F (36.2 °C)   TempSrc: Oral   SpO2: 94%   Weight: 164 lb (74.4 kg)   Height: 5' 6\" (1.676 m)   PainSc:   0 - No pain       Physical Exam  Vitals signs and nursing note reviewed. Constitutional:       Appearance: Normal appearance. Cardiovascular:      Rate and Rhythm: Normal rate and regular rhythm. Pulses: Normal pulses. Heart sounds: Normal heart sounds. Pulmonary:      Effort: Pulmonary effort is normal.   Abdominal:      General: Bowel sounds are normal. There is no distension. Palpations: Abdomen is soft.    Neurological:      Mental Status: She is alert.         No visits with results within 3 Month(s) from this visit. Latest known visit with results is:   Hospital Outpatient Visit on 07/01/2019   Component Date Value Ref Range Status    WBC 07/01/2019 6.5  4.6 - 13.2 K/uL Final    RBC 07/01/2019 4.40  4.20 - 5.30 M/uL Final    HGB 07/01/2019 13.0  12.0 - 16.0 g/dL Final    HCT 07/01/2019 39.2  35.0 - 45.0 % Final    MCV 07/01/2019 89.1  74.0 - 97.0 FL Final    MCH 07/01/2019 29.5  24.0 - 34.0 PG Final    MCHC 07/01/2019 33.2  31.0 - 37.0 g/dL Final    RDW 07/01/2019 13.5  11.6 - 14.5 % Final    PLATELET 00/10/7955 004  135 - 420 K/uL Final    MPV 07/01/2019 11.9* 9.2 - 11.8 FL Final    NEUTROPHILS 07/01/2019 43  40 - 73 % Final    LYMPHOCYTES 07/01/2019 42  21 - 52 % Final    MONOCYTES 07/01/2019 5  3 - 10 % Final    EOSINOPHILS 07/01/2019 9* 0 - 5 % Final    BASOPHILS 07/01/2019 1  0 - 2 % Final    ABS. NEUTROPHILS 07/01/2019 2.8  1.8 - 8.0 K/UL Final    ABS. LYMPHOCYTES 07/01/2019 2.7  0.9 - 3.6 K/UL Final    ABS. MONOCYTES 07/01/2019 0.4  0.05 - 1.2 K/UL Final    ABS. EOSINOPHILS 07/01/2019 0.6* 0.0 - 0.4 K/UL Final    ABS.  BASOPHILS 07/01/2019 0.1  0.0 - 0.1 K/UL Final    DF 07/01/2019 AUTOMATED    Final    Sodium 07/01/2019 136  136 - 145 mmol/L Final    Potassium 07/01/2019 4.3  3.5 - 5.5 mmol/L Final    Chloride 07/01/2019 98* 100 - 108 mmol/L Final    CO2 07/01/2019 29  21 - 32 mmol/L Final    Anion gap 07/01/2019 9  3.0 - 18 mmol/L Final    Glucose 07/01/2019 81  74 - 99 mg/dL Final    BUN 07/01/2019 12  7.0 - 18 MG/DL Final    Creatinine 07/01/2019 0.89  0.6 - 1.3 MG/DL Final    BUN/Creatinine ratio 07/01/2019 13  12 - 20   Final    GFR est AA 07/01/2019 >60  >60 ml/min/1.73m2 Final    GFR est non-AA 07/01/2019 >60  >60 ml/min/1.73m2 Final    Comment: (NOTE)  Estimated GFR is calculated using the Modification of Diet in Renal   Disease (MDRD) Study equation, reported for both  Americans   (GFRAA) and non- Americans (GFRNA), and normalized to 1.73m2   body surface area. The physician must decide which value applies to   the patient. The MDRD study equation should only be used in   individuals age 25 or older. It has not been validated for the   following: pregnant women, patients with serious comorbid conditions,   or on certain medications, or persons with extremes of body size,   muscle mass, or nutritional status.  Calcium 07/01/2019 8.7  8.5 - 10.1 MG/DL Final    Bilirubin, total 07/01/2019 0.4  0.2 - 1.0 MG/DL Final    ALT (SGPT) 07/01/2019 19  13 - 56 U/L Final    AST (SGOT) 07/01/2019 13* 15 - 37 U/L Final    Alk. phosphatase 07/01/2019 83  45 - 117 U/L Final    Protein, total 07/01/2019 6.8  6.4 - 8.2 g/dL Final    Albumin 07/01/2019 4.1  3.4 - 5.0 g/dL Final    Globulin 07/01/2019 2.7  2.0 - 4.0 g/dL Final    A-G Ratio 07/01/2019 1.5  0.8 - 1.7   Final    LIPID PROFILE 07/01/2019        Final    Cholesterol, total 07/01/2019 273* <200 MG/DL Final    Triglyceride 07/01/2019 145  <150 MG/DL Final    Comment: The drugs N-acetylcysteine (NAC) and  Metamiszole have been found to cause falsely  low results in this chemical assay. Please  be sure to submit blood samples obtained  BEFORE administration of either of these  drugs to assure correct results.  HDL Cholesterol 07/01/2019 65* 40 - 60 MG/DL Final    LDL, calculated 07/01/2019 179* 0 - 100 MG/DL Final    VLDL, calculated 07/01/2019 29  MG/DL Final    CHOL/HDL Ratio 07/01/2019 4.2  0 - 5.0   Final       .No results found for any visits on 03/10/20. Assessment / Plan:      ICD-10-CM ICD-9-CM    1. Mixed hyperlipidemia E78.2 272.2 CBC WITH AUTOMATED DIFF      METABOLIC PANEL, COMPREHENSIVE      LIPID PANEL   2. Breast cancer screening Z12.39 V76.10 [facility] MAMMO BI SCREENING INCL CAD   3. Palpitations R00.2 785.1    4. Anxiety F41.9 300.00    5.  Encounter for hepatitis C screening test for low risk patient Z11.59 V73.89 HEPATITIS C AB     Fasting labs prior to next OV  Mammo order  Scree for Hep c  F/u in 6 months    I asked the patient if she  had any questions and answered her  questions. The patient stated that she understands the treatment plan and agrees with the treatment plan    This document was created with a voice activated dictation system and may contain transcription errors.

## 2020-08-11 ENCOUNTER — HOSPITAL ENCOUNTER (OUTPATIENT)
Dept: MAMMOGRAPHY | Age: 65
Discharge: HOME | End: 2020-08-11
Attending: NURSE PRACTITIONER
Payer: COMMERCIAL

## 2020-08-11 DIAGNOSIS — Z12.31 VISIT FOR SCREENING MAMMOGRAM: ICD-10-CM

## 2020-08-11 PROCEDURE — 77063 BREAST TOMOSYNTHESIS BI: CPT

## 2020-09-28 ENCOUNTER — APPOINTMENT (OUTPATIENT)
Dept: FAMILY MEDICINE CLINIC | Age: 65
End: 2020-09-28

## 2020-09-28 ENCOUNTER — HOSPITAL ENCOUNTER (OUTPATIENT)
Dept: LAB | Age: 65
Discharge: HOME | End: 2020-09-28
Payer: COMMERCIAL

## 2020-09-28 DIAGNOSIS — Z11.59 ENCOUNTER FOR HEPATITIS C SCREENING TEST FOR LOW RISK PATIENT: ICD-10-CM

## 2020-09-28 DIAGNOSIS — E78.2 MIXED HYPERLIPIDEMIA: ICD-10-CM

## 2020-09-28 LAB
ALBUMIN SERPL-MCNC: 3.8 G/DL (ref 3.4–5)
ALBUMIN/GLOB SERPL: 1.3 {RATIO} (ref 0.8–1.7)
ALP SERPL-CCNC: 78 U/L (ref 45–117)
ALT SERPL-CCNC: 23 U/L (ref 13–56)
ANION GAP SERPL CALC-SCNC: 7 MMOL/L (ref 3–18)
AST SERPL-CCNC: 16 U/L (ref 10–38)
BASOPHILS # BLD: 0 K/UL (ref 0–0.1)
BASOPHILS NFR BLD: 1 % (ref 0–2)
BILIRUB SERPL-MCNC: 0.4 MG/DL (ref 0.2–1)
BUN SERPL-MCNC: 15 MG/DL (ref 7–18)
BUN/CREAT SERPL: 17 (ref 12–20)
CALCIUM SERPL-MCNC: 9 MG/DL (ref 8.5–10.1)
CHLORIDE SERPL-SCNC: 103 MMOL/L (ref 100–111)
CHOLEST SERPL-MCNC: 223 MG/DL
CO2 SERPL-SCNC: 30 MMOL/L (ref 21–32)
CREAT SERPL-MCNC: 0.9 MG/DL (ref 0.6–1.3)
DIFFERENTIAL METHOD BLD: ABNORMAL
EOSINOPHIL # BLD: 0.6 K/UL (ref 0–0.4)
EOSINOPHIL NFR BLD: 10 % (ref 0–5)
ERYTHROCYTE [DISTWIDTH] IN BLOOD BY AUTOMATED COUNT: 13.4 % (ref 11.6–14.5)
GLOBULIN SER CALC-MCNC: 2.9 G/DL (ref 2–4)
GLUCOSE SERPL-MCNC: 111 MG/DL (ref 74–99)
HCT VFR BLD AUTO: 39.8 % (ref 35–45)
HDLC SERPL-MCNC: 56 MG/DL (ref 40–60)
HDLC SERPL: 4 {RATIO} (ref 0–5)
HGB BLD-MCNC: 13 G/DL (ref 12–16)
LDLC SERPL CALC-MCNC: 134 MG/DL (ref 0–100)
LIPID PROFILE,FLP: ABNORMAL
LYMPHOCYTES # BLD: 2.2 K/UL (ref 0.9–3.6)
LYMPHOCYTES NFR BLD: 35 % (ref 21–52)
MCH RBC QN AUTO: 29.5 PG (ref 24–34)
MCHC RBC AUTO-ENTMCNC: 32.7 G/DL (ref 31–37)
MCV RBC AUTO: 90.5 FL (ref 74–97)
MONOCYTES # BLD: 0.3 K/UL (ref 0.05–1.2)
MONOCYTES NFR BLD: 5 % (ref 3–10)
NEUTS SEG # BLD: 3.1 K/UL (ref 1.8–8)
NEUTS SEG NFR BLD: 49 % (ref 40–73)
PLATELET # BLD AUTO: 245 K/UL (ref 135–420)
PMV BLD AUTO: 12.4 FL (ref 9.2–11.8)
POTASSIUM SERPL-SCNC: 4.5 MMOL/L (ref 3.5–5.5)
PROT SERPL-MCNC: 6.7 G/DL (ref 6.4–8.2)
RBC # BLD AUTO: 4.4 M/UL (ref 4.2–5.3)
SODIUM SERPL-SCNC: 140 MMOL/L (ref 136–145)
TRIGL SERPL-MCNC: 165 MG/DL (ref ?–150)
VLDLC SERPL CALC-MCNC: 33 MG/DL
WBC # BLD AUTO: 6.3 K/UL (ref 4.6–13.2)

## 2020-09-28 PROCEDURE — 86803 HEPATITIS C AB TEST: CPT

## 2020-09-28 PROCEDURE — 36415 COLL VENOUS BLD VENIPUNCTURE: CPT

## 2020-09-28 PROCEDURE — 85025 COMPLETE CBC W/AUTO DIFF WBC: CPT

## 2020-09-28 PROCEDURE — 80061 LIPID PANEL: CPT

## 2020-09-28 PROCEDURE — 80053 COMPREHEN METABOLIC PANEL: CPT

## 2020-09-29 LAB
HCV AB SER IA-ACNC: 0.11 INDEX
HCV AB SERPL QL IA: NEGATIVE
HCV COMMENT,HCGAC: NORMAL

## 2020-10-01 ENCOUNTER — VIRTUAL VISIT (OUTPATIENT)
Dept: FAMILY MEDICINE CLINIC | Age: 65
End: 2020-10-01
Payer: COMMERCIAL

## 2020-10-01 DIAGNOSIS — R00.2 PALPITATIONS: Primary | ICD-10-CM

## 2020-10-01 DIAGNOSIS — E78.2 MIXED HYPERLIPIDEMIA: ICD-10-CM

## 2020-10-01 DIAGNOSIS — Z12.11 COLON CANCER SCREENING: ICD-10-CM

## 2020-10-01 PROBLEM — I82.409 DVT (DEEP VENOUS THROMBOSIS) (HCC): Status: RESOLVED | Noted: 2018-01-23 | Resolved: 2020-10-01

## 2020-10-01 PROCEDURE — 99442 PR PHYS/QHP TELEPHONE EVALUATION 11-20 MIN: CPT | Performed by: NURSE PRACTITIONER

## 2020-10-01 RX ORDER — LORAZEPAM 0.5 MG/1
1 TABLET ORAL 3 TIMES DAILY
COMMUNITY
Start: 2020-09-02

## 2020-10-01 RX ORDER — PROPRANOLOL HYDROCHLORIDE 80 MG/1
CAPSULE, EXTENDED RELEASE ORAL
Qty: 90 CAP | Refills: 1 | Status: SHIPPED | OUTPATIENT
Start: 2020-10-01 | End: 2021-04-12

## 2020-10-01 NOTE — PROGRESS NOTES
Derrek Thomas is a 59 y.o. female, evaluated via audio-only technology on 10/1/2020 for Follow-up (patient would like referral colonscopy)  . Assessment & Plan:   Diagnoses and all orders for this visit:    1. Palpitations  -     propranolol LA (INDERAL LA) 80 mg SR capsule; TAKE 1 CAPSULE BY MOUTH ONCE DAILY    2. Colon cancer screening  -     REFERRAL TO GASTROENTEROLOGY    3. Mixed hyperlipidemia    Hyperlipidemia-patient would like to work on lifestyle modification. She is not interested in starting a statin. .12  Subjective: The patient presents for an Audio-visual teleconference appointment for routine follow-up care. Patient has a medical history for mixed hyperlipidemia, palpitation and depression. She also notes that she had a previous history for DVT that has been resolved. She presents without any chest pain, palpitation, lower extremity edema. She denies any cough, fever, loss of smell or taste or GI upset. Palpitations-patient is prescribed propanolol which controls her symptoms. She denied any dizziness or headaches. Prior to Admission medications    Medication Sig Start Date End Date Taking? Authorizing Provider   LORazepam (ATIVAN) 0.5 mg tablet Take 0.5 mg by mouth daily. 9/2/20  Yes Provider, Historical   propranolol LA (INDERAL LA) 80 mg SR capsule TAKE 1 CAPSULE BY MOUTH ONCE DAILY 10/1/20  Yes Alecia Alarcon NP   escitalopram (LEXAPRO) 20 mg tablet Take 20 mg by mouth daily.    Yes Provider, Historical   propranolol LA (INDERAL LA) 80 mg SR capsule TAKE 1 CAPSULE BY MOUTH ONCE DAILY 12/11/19 10/1/20  Carina Love MD   fluticasone [facility]) 50 mcg/actuation nasal spray 2 spray each nostril daily 11/15/17   Carina Love MD     Patient Active Problem List   Diagnosis Code    Dermatitis L30.9    Benign hematuria N02.9    Palpitations R00.2    Depressive disorder F32.9    Pure hypercholesterolemia E78.00    Venous (peripheral) insufficiency I87.2    Varicose veins of leg with pain, left I83.812    Mixed hyperlipidemia E78.2     Patient Active Problem List    Diagnosis Date Noted    Mixed hyperlipidemia 03/10/2020    Varicose veins of leg with pain, left 05/01/2018    Venous (peripheral) insufficiency 01/16/2018    Dermatitis     Benign hematuria     Palpitations     Depressive disorder     Pure hypercholesterolemia      Current Outpatient Medications   Medication Sig Dispense Refill    LORazepam (ATIVAN) 0.5 mg tablet Take 0.5 mg by mouth daily.  propranolol LA (INDERAL LA) 80 mg SR capsule TAKE 1 CAPSULE BY MOUTH ONCE DAILY 90 Cap 1    escitalopram (LEXAPRO) 20 mg tablet Take 20 mg by mouth daily.  fluticasone (FLONASE) 50 mcg/actuation nasal spray 2 spray each nostril daily 1 Bottle 1     Allergies   Allergen Reactions    Latex Rash and Contact Dermatitis    Statins-Hmg-Coa Reductase Inhibitors Myalgia     Past Medical History:   Diagnosis Date    Benign hematuria     Depressive disorder     Dermatitis     Palpitations     Pure hypercholesterolemia        ROS    Constitutional: No apparent distress noted  General- negative for fever, chills or fatigue  Eyes- negative visual changes  CV- denies chest pain, palpitation  Pul: negative cough or SOB  GI: negative nausea, flank pain, diarrhea, constipation  Urinary:- No dysuria or polyuria  MS- negative myalgia, negative joint pain  Neuro- negative headache, dizziness or weakness  Skin- negative for rashes or lesions. Psych- denies any anxiety or depression    No flowsheet data found. Martha Man, who was evaluated through a patient-initiated, synchronous (real-time) audio only encounter, and/or her healthcare decision maker, is aware that it is a billable service, with coverage as determined by her insurance carrier. She provided verbal consent to proceed: Yes. She has not had a related appointment within my department in the past 7 days or scheduled within the next 24 hours.       Total Time: minutes: 11-20 minutes    Fany Pacheco, NP

## 2020-10-01 NOTE — PROGRESS NOTES
Dale Peter presents today for   Chief Complaint   Patient presents with    Follow-up     patient would like referral colonscopy       Virtual/telephone visit    Depression Screening:  3 most recent PHQ Screens 10/1/2020   Little interest or pleasure in doing things Not at all   Feeling down, depressed, irritable, or hopeless Not at all   Total Score PHQ 2 0       Learning Assessment:  Learning Assessment 2/22/2018   PRIMARY LEARNER Patient   BARRIERS PRIMARY LEARNER -   [facility]   LEARNER PREFERENCE PRIMARY LISTENING     -   ANSWERED BY patient   RELATIONSHIP SELF       Abuse Screening:  Abuse Screening Questionnaire 10/1/2020   Do you ever feel afraid of your partner? N   Are you in a relationship with someone who physically or mentally threatens you? N   Is it safe for you to go home? Y       Fall Risk  No flowsheet data found. Health Maintenance reviewed and discussed and ordered per Provider. Health Maintenance Due   Topic Date Due    Hepatitis C Screening  1955    DTaP/Tdap/Td series (1 - Tdap) 11/29/1976    PAP AKA CERVICAL CYTOLOGY  11/29/1976    Shingrix Vaccine Age 50> (1 of 2) 11/29/2005    Flu Vaccine (1) 09/01/2020    Bone Densitometry (Dexa) Screening  11/29/2020   . Coordination of Care:  1. Have you been to the ER, urgent care clinic since your last visit? Hospitalized since your last visit? no    2. Have you seen or consulted any other health care providers outside of the [address] [place] [address] since your last visit? Include any pap smears or colon screening.  no

## 2020-10-02 ENCOUNTER — TELEPHONE (OUTPATIENT)
Dept: FAMILY MEDICINE CLINIC | Age: 65
End: 2020-10-02

## 2021-04-08 DIAGNOSIS — R00.2 PALPITATIONS: ICD-10-CM

## 2021-04-08 NOTE — TELEPHONE ENCOUNTER
Requested Prescriptions     Pending Prescriptions Disp Refills    propranolol LA (INDERAL LA) 80 mg SR capsule [Pharmacy Med Name: Propranolol HCl ER 80 MG Oral Capsule Extended Release 24 Hour] 90 Cap 0     Sig: Take 1 capsule by mouth once daily     Next appt 04/22/21

## 2021-04-12 RX ORDER — PROPRANOLOL HYDROCHLORIDE 80 MG/1
CAPSULE, EXTENDED RELEASE ORAL
Qty: 90 CAP | Refills: 0 | Status: SHIPPED | OUTPATIENT
Start: 2021-04-12 | End: 2021-04-13 | Stop reason: SDUPTHER

## 2021-04-13 DIAGNOSIS — R00.2 PALPITATIONS: ICD-10-CM

## 2021-04-13 RX ORDER — PROPRANOLOL HYDROCHLORIDE 80 MG/1
CAPSULE, EXTENDED RELEASE ORAL
Qty: 90 CAP | Refills: 0 | Status: SHIPPED | OUTPATIENT
Start: 2021-04-13 | End: 2021-07-26 | Stop reason: SDUPTHER

## 2021-05-21 ENCOUNTER — OFFICE VISIT (OUTPATIENT)
Dept: INTERNAL MEDICINE CLINIC | Age: 66
End: 2021-05-21
Payer: COMMERCIAL

## 2021-05-21 VITALS
TEMPERATURE: 98.1 F | OXYGEN SATURATION: 96 % | RESPIRATION RATE: 12 BRPM | BODY MASS INDEX: 26.23 KG/M2 | SYSTOLIC BLOOD PRESSURE: 136 MMHG | WEIGHT: 163.2 LBS | HEART RATE: 56 BPM | HEIGHT: 66 IN | DIASTOLIC BLOOD PRESSURE: 71 MMHG

## 2021-05-21 DIAGNOSIS — L98.9 SKIN LESION OF CHEST WALL: ICD-10-CM

## 2021-05-21 DIAGNOSIS — Z23 ENCOUNTER FOR IMMUNIZATION: ICD-10-CM

## 2021-05-21 DIAGNOSIS — N02.9 BENIGN HEMATURIA: ICD-10-CM

## 2021-05-21 DIAGNOSIS — F41.1 ANXIETY, GENERALIZED: Primary | ICD-10-CM

## 2021-05-21 DIAGNOSIS — E78.2 MIXED HYPERLIPIDEMIA: ICD-10-CM

## 2021-05-21 DIAGNOSIS — F32.A DEPRESSIVE DISORDER: ICD-10-CM

## 2021-05-21 PROCEDURE — 99214 OFFICE O/P EST MOD 30 MIN: CPT | Performed by: INTERNAL MEDICINE

## 2021-05-21 PROCEDURE — 90732 PPSV23 VACC 2 YRS+ SUBQ/IM: CPT | Performed by: INTERNAL MEDICINE

## 2021-05-21 PROCEDURE — 90471 IMMUNIZATION ADMIN: CPT | Performed by: INTERNAL MEDICINE

## 2021-05-21 RX ORDER — ESCITALOPRAM OXALATE 20 MG/1
20 TABLET ORAL DAILY
Qty: 60 TABLET | Refills: 2 | Status: SHIPPED | OUTPATIENT
Start: 2021-05-21

## 2021-05-21 NOTE — PATIENT INSTRUCTIONS
Vaccine Information Statement Pneumococcal Polysaccharide Vaccine (PPSV23): What You Need to Know Many Vaccine Information Statements are available in Faroese and other languages. See www.immunize.org/vis Hojas de información sobre vacunas están disponibles en español y en muchos otros idiomas. Visite www.immunize.org/vis 1. Why get vaccinated? Pneumococcal polysaccharide vaccine (PPSV23) can prevent pneumococcal disease. Pneumococcal disease refers to any illness caused by pneumococcal bacteria. These bacteria can cause many types of illnesses, including pneumonia, which is an infection of the lungs. Pneumococcal bacteria are one of the most common causes of pneumonia. Besides pneumonia, pneumococcal bacteria can also cause: 
 Ear infections  Sinus infections  Meningitis (infection of the tissue covering the brain and spinal cord)  Bacteremia (bloodstream infection) Anyone can get pneumococcal disease, but children under 3years of age, people with certain medical conditions, adults 72 years or older, and cigarette smokers are at the highest risk. Most pneumococcal infections are mild. However, some can result in long-term problems, such as brain damage or hearing loss. Meningitis, bacteremia, and pneumonia caused by pneumococcal disease can be fatal.  
 
2. PPSV23  
 
PPSV23 protects against 23 types of bacteria that cause pneumococcal disease. PPSV23 is recommended for:  All adults 72 years or older,  Anyone 2 years or older with certain medical conditions that can lead to an increased risk for pneumococcal disease. Most people need only one dose of PPSV23. A second dose of PPSV23, and another type of pneumococcal vaccine called PCV13, are recommended for certain high-risk groups. Your health care provider can give you more information.  
 
People 65 years or older should get a dose of PPSV23 even if they have already gotten one or more doses of the vaccine before they turned 72. 
 
3. Talk with your health care provider Tell your vaccine provider if the person getting the vaccine: 
 Has had an allergic reaction after a previous dose of PPSV23, or has any severe, life-threatening allergies. In some cases, your health care provider may decide to postpone PPSV23 vaccination to a future visit. People with minor illnesses, such as a cold, may be vaccinated. People who are moderately or severely ill should usually wait until they recover before getting PPSV23. Your health care provider can give you more information. 4. Risks of a vaccine reaction  Redness or pain where the shot is given, feeling tired, fever, or muscle aches can happen after PPSV23. People sometimes faint after medical procedures, including vaccination. Tell your provider if you feel dizzy or have vision changes or ringing in the ears. As with any medicine, there is a very remote chance of a vaccine causing a severe allergic reaction, other serious injury, or death. 5. What if there is a serious problem? An allergic reaction could occur after the vaccinated person leaves the clinic. If you see signs of a severe allergic reaction (hives, swelling of the face and throat, difficulty breathing, a fast heartbeat, dizziness, or weakness), call 9-1-1 and get the person to the nearest hospital. 
 
For other signs that concern you, call your health care provider. Adverse reactions should be reported to the Vaccine Adverse Event Reporting System (VAERS). Your health care provider will usually file this report, or you can do it yourself. Visit the VAERS website at www.vaers. hhs.gov or call 6-893.687.4104. VAERS is only for reporting reactions, and VAERS staff do not give medical advice. 6. How can I learn more?  Ask your health care provider.  Call your local or state health department.  
 Contact the Centers for Disease Control and Prevention (CDC): 
- Call 9-345.236.2472 (4-440-EGW-INFO) or 
- Visit CDCs website at www.cdc.gov/vaccines Vaccine Information Statement PPSV23  
10/30/2019 Department of Health and [employer] Centers for Disease Control and Prevention Office Use Only

## 2021-05-23 NOTE — PROGRESS NOTES
PEDRO Richmond is a 20-year-old female, previously seen in the [facility], here to establish care with me today. She is here with her , who has had a stroke within the past few months. Her sister was recently diagnosed with colon cancer at age 58, and Mrs. Scot Clay had a colonoscopy last month with Dr. Cristal Yu. She sees a psychiatrist for depression and anxiety, she takes an antidepressant, plus a milligram of Ativan 3 times a day. She wonders if I would take over writing this from her. I advised her I will do so, but with the agreement that we will not increase her Ativan dose. If this is needed, I will refer her back to specialist care. She agrees to this. We discussed getting a Pneumovax today, and she and her  were each given one today. She notes a skin lesion on her left chest that she noticed a few days ago. She scratched it with a nail and unroofed it. It now appears as a 5 x 5 mm nodule with a depressed center. It has no discharge. Past Medical History:   Diagnosis Date    Anxiety, generalized     Benign hematuria     Depressive disorder     Dermatitis     Palpitations     Pure hypercholesterolemia         Past Surgical History:   Procedure Laterality Date    COLONOSCOPY N/A 8/8/2018    COLONOSCOPY / polypect[facility] Sher MD [facility] ENDOSCOPY    HX HYSTERECTOMY      age 45        Current Outpatient Medications   Medication Sig Dispense Refill    escitalopram oxalate (Lexapro) 20 mg tablet Take 1 Tablet by mouth daily. 60 Tablet 2    propranolol LA (INDERAL LA) 80 mg SR capsule Take 1 capsule by mouth once daily 90 Cap 0    LORazepam (ATIVAN) 0.5 mg tablet Take 1 mg by mouth three (3) times daily.       fluticasone (FLONASE) 50 mcg/actuation nasal spray 2 spray each nostril daily 1 Bottle 1        Allergies   Allergen Reactions    Latex Rash and Contact Dermatitis    Statins-Hmg-Coa Reductase Inhibitors Myalgia        Social History Socioeconomic History    Marital status:      Spouse name: Not on file    Number of children: Not on file    Years of education: Not on file    Highest education level: Not on file   Occupational History    Not on file   Tobacco Use    Smoking status: Former Smoker     Quit date:      Years since quittin.4    Smokeless tobacco: Never Used   Substance and Sexual Activity    Alcohol use: Yes     Comment: occasional    Drug use: Not on file    Sexual activity: Not on file   Other Topics Concern    Not on file   Social History Narrative    Not on file     Social Determinants of Health     Financial Resource Strain:     Difficulty of Paying Living Expenses:    Food Insecurity:     Worried About [address] in the Last Year:     [address] [employer] in the Last Year:    Transportation Needs:     Lack of Transportation (Medical):      Lack of Transportation (Non-Medical):    Physical Activity:     Days of Exercise per Week:     Minutes of Exercise per Session:    Stress:     Feeling of Stress :    Social Connections:     Frequency of Communication with Friends and Family:     Frequency of Social Gatherings with Friends and Family:     Attends Muslim Services:     Active Member of Clubs or Organizations:     Attends Club or Organization Meetings:     Marital Status:    Intimate Partner Violence:     Fear of Current or Ex-Partner:     Emotionally Abused:     Physically Abused:     Sexually Abused:         Family History   Problem Relation Age of Onset    Stroke Mother     Cancer Mother     Heart Disease Father     Diabetes Father     Hypertension Sister     Colon Cancer Sister         diagnosed age 58           Visit Vitals  /71 (BP 1 Location: Left upper arm, BP Patient Position: Sitting, BP Cuff Size: Large adult)   Pulse (!) 56   Temp 98.1 °F (36.7 °C) (Temporal)   Resp 12   Ht 5' 6\" (1.676 m)   Wt 163 lb 3.2 oz (74 kg)   SpO2 96%   BMI 26.34 kg/m² Review of Systems   Constitutional: Negative for malaise/fatigue. Eyes: Negative for blurred vision. Cardiovascular: Negative for chest pain. Gastrointestinal: Negative for blood in stool. Genitourinary: Negative for hematuria. Neurological: Negative for headaches. Physical Exam  Eyes:      General: No scleral icterus. Conjunctiva/sclera: Conjunctivae normal.   Cardiovascular:      Rate and Rhythm: Normal rate and regular rhythm. Pulses: Normal pulses. Heart sounds: No friction rub. No gallop. Pulmonary:      Effort: Pulmonary effort is normal.      Breath sounds: Normal breath sounds. Abdominal:      General: Abdomen is flat. Palpations: Abdomen is soft. There is no mass. Tenderness: There is no abdominal tenderness. Musculoskeletal:      Cervical back: Neck supple. Comments: No clubbing, cyanosis, or edema. Skin:     General: Skin is warm and dry. Neurological:      Mental Status: She is alert and oriented to person, place, and time. Psychiatric:         Mood and Affect: Mood normal.     Skin:       On the left side of her chest is a 5 x 5 mm pearly raised nodule with a central depression. There are some telangiectasias in the nodule. Diagnoses and all orders for this visit:    1. Anxiety, generalized    2. Depressive disorder  -     escitalopram oxalate (Lexapro) 20 mg tablet; Take 1 Tablet by mouth daily.  -     METABOLIC PANEL, COMPREHENSIVE; Future    3. Mixed hyperlipidemia  -     LIPID PANEL; Future    4. Benign hematuria  -     URINALYSIS W/MICROSCOPIC; Future    5. Encounter for immunization  -     PNEUMOCOCCAL POLYSACCHARIDE VACCINE, 23-VALENT, ADULT OR IMMUNOSUPPRESSED PT DOSE,    6. Skin lesion of chest wall    Recommend see dermatology to rule out basal cell carcinoma.     Follow-up and Dispositions    · Return in about 6 months (around 11/21/2021) for 30 minute physical.        Return to office in 6 months for a physical.      Raz Castaneda MD

## 2021-07-26 DIAGNOSIS — R00.2 PALPITATIONS: ICD-10-CM

## 2021-07-26 RX ORDER — PROPRANOLOL HYDROCHLORIDE 80 MG/1
CAPSULE, EXTENDED RELEASE ORAL
Qty: 90 CAPSULE | Refills: 1 | Status: SHIPPED | OUTPATIENT
Start: 2021-07-26 | End: 2022-02-01 | Stop reason: SDUPTHER

## 2021-07-26 NOTE — TELEPHONE ENCOUNTER
Last Visit: 5/21/21 with MD Pete Esquivel  Next Appointment: 11/22/21 with MD Pete Esquivel  Previous Refill Encounter(s): 4/13/21 #90    Requested Prescriptions     Pending Prescriptions Disp Refills    propranolol LA (INDERAL LA) 80 mg SR capsule 90 Capsule 1     Sig: Take 1 capsule by mouth once daily

## 2021-08-16 ENCOUNTER — TELEPHONE (OUTPATIENT)
Dept: INTERNAL MEDICINE CLINIC | Age: 66
End: 2021-08-16

## 2021-08-16 NOTE — TELEPHONE ENCOUNTER
Per pt she has had the covid vaccine. Stated her card was copied at [address] [facility]    She had the Sr Yoseph vaccine 04/03/2021 and 2nd dose 04/24/2021    Asking please update her chart.

## 2021-11-15 ENCOUNTER — HOSPITAL ENCOUNTER (OUTPATIENT)
Dept: LAB | Age: 66
Discharge: HOME | End: 2021-11-15
Payer: COMMERCIAL

## 2021-11-15 ENCOUNTER — APPOINTMENT (OUTPATIENT)
Dept: INTERNAL MEDICINE CLINIC | Age: 66
End: 2021-11-15

## 2021-11-15 DIAGNOSIS — N02.9 BENIGN HEMATURIA: ICD-10-CM

## 2021-11-15 DIAGNOSIS — E78.2 MIXED HYPERLIPIDEMIA: ICD-10-CM

## 2021-11-15 DIAGNOSIS — F32.A DEPRESSIVE DISORDER: ICD-10-CM

## 2021-11-15 LAB
ALBUMIN SERPL-MCNC: 3.6 G/DL (ref 3.4–5)
ALBUMIN/GLOB SERPL: 1.2 {RATIO} (ref 0.8–1.7)
ALP SERPL-CCNC: 87 U/L (ref 45–117)
ALT SERPL-CCNC: 21 U/L (ref 13–56)
ANION GAP SERPL CALC-SCNC: 4 MMOL/L (ref 3–18)
APPEARANCE UR: CLEAR
AST SERPL-CCNC: 17 U/L (ref 10–38)
BILIRUB SERPL-MCNC: 0.7 MG/DL (ref 0.2–1)
BILIRUB UR QL: NEGATIVE
BUN SERPL-MCNC: 11 MG/DL (ref 7–18)
BUN/CREAT SERPL: 13 (ref 12–20)
CALCIUM SERPL-MCNC: 8.5 MG/DL (ref 8.5–10.1)
CHLORIDE SERPL-SCNC: 103 MMOL/L (ref 100–111)
CHOLEST SERPL-MCNC: 242 MG/DL
CO2 SERPL-SCNC: 29 MMOL/L (ref 21–32)
COLOR UR: YELLOW
CREAT SERPL-MCNC: 0.88 MG/DL (ref 0.6–1.3)
GLOBULIN SER CALC-MCNC: 3 G/DL (ref 2–4)
GLUCOSE SERPL-MCNC: 103 MG/DL (ref 74–99)
GLUCOSE UR STRIP.AUTO-MCNC: NEGATIVE MG/DL
HDLC SERPL-MCNC: 48 MG/DL (ref 40–60)
HDLC SERPL: 5 {RATIO} (ref 0–5)
HGB UR QL STRIP: NEGATIVE
KETONES UR QL STRIP.AUTO: NEGATIVE MG/DL
LDLC SERPL CALC-MCNC: 164.2 MG/DL (ref 0–100)
LEUKOCYTE ESTERASE UR QL STRIP.AUTO: NEGATIVE
LIPID PROFILE,FLP: ABNORMAL
NITRITE UR QL STRIP.AUTO: NEGATIVE
PH UR STRIP: 5.5 [PH] (ref 5–8)
POTASSIUM SERPL-SCNC: 4.6 MMOL/L (ref 3.5–5.5)
PROT SERPL-MCNC: 6.6 G/DL (ref 6.4–8.2)
PROT UR STRIP-MCNC: NEGATIVE MG/DL
SODIUM SERPL-SCNC: 136 MMOL/L (ref 136–145)
SP GR UR REFRACTOMETRY: 1.01 (ref 1–1.03)
TRIGL SERPL-MCNC: 149 MG/DL (ref ?–150)
UROBILINOGEN UR QL STRIP.AUTO: 0.2 EU/DL (ref 0.2–1)
VLDLC SERPL CALC-MCNC: 29.8 MG/DL

## 2021-11-15 PROCEDURE — 80061 LIPID PANEL: CPT

## 2021-11-15 PROCEDURE — 81003 URINALYSIS AUTO W/O SCOPE: CPT

## 2021-11-15 PROCEDURE — 80053 COMPREHEN METABOLIC PANEL: CPT

## 2021-11-16 ENCOUNTER — DOCUMENTATION ONLY (OUTPATIENT)
Dept: INTERNAL MEDICINE CLINIC | Age: 66
End: 2021-11-16

## 2021-11-22 ENCOUNTER — OFFICE VISIT (OUTPATIENT)
Dept: INTERNAL MEDICINE CLINIC | Age: 66
End: 2021-11-22
Payer: MEDICARE

## 2021-11-22 VITALS
WEIGHT: 168.2 LBS | OXYGEN SATURATION: 97 % | DIASTOLIC BLOOD PRESSURE: 71 MMHG | SYSTOLIC BLOOD PRESSURE: 104 MMHG | RESPIRATION RATE: 18 BRPM | HEIGHT: 66 IN | HEART RATE: 65 BPM | BODY MASS INDEX: 27.03 KG/M2 | TEMPERATURE: 97 F

## 2021-11-22 DIAGNOSIS — Z23 NEEDS FLU SHOT: ICD-10-CM

## 2021-11-22 DIAGNOSIS — F32.A DEPRESSIVE DISORDER: ICD-10-CM

## 2021-11-22 DIAGNOSIS — F41.1 ANXIETY, GENERALIZED: ICD-10-CM

## 2021-11-22 DIAGNOSIS — Z71.89 ADVANCED DIRECTIVES, COUNSELING/DISCUSSION: ICD-10-CM

## 2021-11-22 DIAGNOSIS — Z00.00 INITIAL MEDICARE ANNUAL WELLNESS VISIT: Primary | ICD-10-CM

## 2021-11-22 DIAGNOSIS — R07.89 OTHER CHEST PAIN: ICD-10-CM

## 2021-11-22 PROCEDURE — 99213 OFFICE O/P EST LOW 20 MIN: CPT | Performed by: INTERNAL MEDICINE

## 2021-11-22 PROCEDURE — G9717 DOC PT DX DEP/BP F/U NT REQ: HCPCS | Performed by: INTERNAL MEDICINE

## 2021-11-22 PROCEDURE — 99497 ADVNCD CARE PLAN 30 MIN: CPT | Performed by: INTERNAL MEDICINE

## 2021-11-22 PROCEDURE — 93010 ELECTROCARDIOGRAM REPORT: CPT | Performed by: INTERNAL MEDICINE

## 2021-11-22 PROCEDURE — 1090F PRES/ABSN URINE INCON ASSESS: CPT | Performed by: INTERNAL MEDICINE

## 2021-11-22 PROCEDURE — G0463 HOSPITAL OUTPT CLINIC VISIT: HCPCS | Performed by: INTERNAL MEDICINE

## 2021-11-22 PROCEDURE — 3017F COLORECTAL CA SCREEN DOC REV: CPT | Performed by: INTERNAL MEDICINE

## 2021-11-22 PROCEDURE — G0438 PPPS, INITIAL VISIT: HCPCS | Performed by: INTERNAL MEDICINE

## 2021-11-22 PROCEDURE — 1101F PT FALLS ASSESS-DOCD LE1/YR: CPT | Performed by: INTERNAL MEDICINE

## 2021-11-22 PROCEDURE — G9899 SCRN MAM PERF RSLTS DOC: HCPCS | Performed by: INTERNAL MEDICINE

## 2021-11-22 PROCEDURE — G8536 NO DOC ELDER MAL SCRN: HCPCS | Performed by: INTERNAL MEDICINE

## 2021-11-22 PROCEDURE — 93005 ELECTROCARDIOGRAM TRACING: CPT | Performed by: INTERNAL MEDICINE

## 2021-11-22 PROCEDURE — G8399 PT W/DXA RESULTS DOCUMENT: HCPCS | Performed by: INTERNAL MEDICINE

## 2021-11-22 PROCEDURE — G8427 DOCREV CUR MEDS BY ELIG CLIN: HCPCS | Performed by: INTERNAL MEDICINE

## 2021-11-22 PROCEDURE — G0008 ADMIN INFLUENZA VIRUS VAC: HCPCS | Performed by: INTERNAL MEDICINE

## 2021-11-22 PROCEDURE — G8419 CALC BMI OUT NRM PARAM NOF/U: HCPCS | Performed by: INTERNAL MEDICINE

## 2021-11-22 NOTE — PROGRESS NOTES
HPI     Antonella Mishra is here for her initial Medicare wellness visit and advance care planning. She is very sad and anxious, crying during much of the interview. She contributes this feeling to feeling like her  has changed since he had his stroke-she is no longer affectionate with her, and she feels he is not the same person anymore. She will see her psychiatrist next month. She is overwhelmed with the number of disability forms have to be completed for her . She denies suicidal ideation. She feels an intermittent \"weakness\" in the substernal area, not related to eating she wonders if it is anxiety. Does not radiate, and has no associated symptoms. Past Medical History:   Diagnosis Date    Anxiety, generalized     Benign hematuria     Depressive disorder     Dermatitis     Palpitations     Pure hypercholesterolemia         Past Surgical History:   Procedure Laterality Date    COLONOSCOPY N/A 8/8/2018    COLONOSCOPY / polypectomy performed by Jessy Chavez MD at [facility] ENDOSCOPY    HX HYSTERECTOMY      age 45        Current Outpatient Medications   Medication Sig Dispense Refill    propranolol LA (INDERAL LA) 80 mg SR capsule Take 1 capsule by mouth once daily 90 Capsule 1    escitalopram oxalate (Lexapro) 20 mg tablet Take 1 Tablet by mouth daily. 60 Tablet 2    LORazepam (ATIVAN) 0.5 mg tablet Take 1 mg by mouth three (3) times daily.       fluticasone (FLONASE) 50 mcg/actuation nasal spray 2 spray each nostril daily 1 Bottle 1        Allergies   Allergen Reactions    Latex Rash and Contact Dermatitis    Statins-Hmg-Coa Reductase Inhibitors Myalgia        Social History     Socioeconomic History    Marital status:      Spouse name: Not on file    Number of children: Not on file    Years of education: Not on file    Highest education level: Not on file   Occupational History    Not on file   Tobacco Use    Smoking status: Former Smoker     Packs/day: 1.00 Years: 30.00     Pack years: 30.00     Start date: 5     Quit date: 2000     Years since quittin.9    Smokeless tobacco: Never Used   Substance and Sexual Activity    Alcohol use: Yes     Comment: occasional    Drug use: Not on file    Sexual activity: Not on file   Other Topics Concern    Not on file   Social History Narrative    Not on file     Social Determinants of Health     Financial Resource Strain:     Difficulty of Paying Living Expenses: Not on file   Food Insecurity:     Worried About Running Out of Food in the Last Year: Not on file    Joanne of Food in the Last Year: Not on file   Transportation Needs:     Lack of Transportation (Medical): Not on file    Lack of Transportation (Non-Medical):  Not on file   Physical Activity:     Days of Exercise per Week: Not on file    Minutes of Exercise per Session: Not on file   Stress:     Feeling of Stress : Not on file   Social Connections:     Frequency of Communication with Friends and Family: Not on file    Frequency of Social Gatherings with Friends and Family: Not on file    Attends Tenriism Services: Not on file    Active Member of Clubs or Organizations: Not on file    Attends Club or Organization Meetings: Not on file    Marital Status: Not on file   Intimate Partner Violence:     Fear of Current or Ex-Partner: Not on file    Emotionally Abused: Not on file    Physically Abused: Not on file    Sexually Abused: Not on file   Housing Stability:     Unable to Pay for Housing in the Last Year: Not on file    Number of [place] in the Last Year: Not on file    Unstable Housing in the Last Year: Not on file        Family History   Problem Relation Age of Onset    Stroke Mother     Cancer Mother     Heart Disease Father     Diabetes Father     Hypertension Sister     Colon Cancer Sister         diagnosed age 58           Visit Vitals  /71   Pulse 65   Temp 97 °F (36.1 °C) (Temporal)   Resp 18   Ht 5' 6\" (1.676 m)   Wt 168 lb 3.2 oz (76.3 kg)   SpO2 97%   BMI 27.15 kg/m²        Review of Systems   Constitutional: Negative for diaphoresis. Respiratory: Negative for shortness of breath. Gastrointestinal: Positive for nausea. Negative for blood in stool. Genitourinary: Negative for hematuria. Psychiatric/Behavioral: Positive for depression. The patient is nervous/anxious. Physical Exam  Constitutional:       Appearance: She is not ill-appearing. HENT:      Right Ear: Tympanic membrane, ear canal and external ear normal.      Left Ear: Tympanic membrane, ear canal and external ear normal.   Eyes:      General: No scleral icterus. Conjunctiva/sclera: Conjunctivae normal.   Neck:      Comments: Supple, normal bilateral carotid upstrokes to palpation. Lymph: No cervical, supraclavicular, or axillary lymphadenopathy. Cardiovascular:      Rate and Rhythm: Normal rate and regular rhythm. Pulses: Normal pulses. Heart sounds: No friction rub. No gallop. Pulmonary:      Effort: Pulmonary effort is normal.      Breath sounds: Normal breath sounds. Abdominal:      Palpations: Abdomen is soft. Tenderness: There is no abdominal tenderness. Musculoskeletal:      Comments: No clubbing, cyanosis, or edema. Skin:     General: Skin is warm and dry. Neurological:      Mental Status: She is alert and oriented to person, place, and time. Psychiatric:         Behavior: Behavior normal.         Thought Content: Thought content normal.         Judgment: Judgment normal.      Comments: Crying. Diagnoses and all orders for this visit:    1. Initial Medicare annual wellness visit  -     ADVANCE CARE PLANNING FIRST 30 MINS    2. Advanced directives, counseling/discussion  -     ADVANCE CARE PLANNING FIRST 30 MINS    3. Other chest pain  Comments: We will arrange stress testing  Orders:  -     AMB POC EKG ROUTINE W/ 12 LEADS, INTER & REP    4. Anxiety, generalized  Comments:   On lorazepam and Lexapro, advised make appointment with psychiatrist.    5. Depressive disorder  Comments: At least partially situational status post  strokes. Continue meds, make appointment with Dr. Mg Cast    6. Needs flu shot  Comments:  Vaccine given during today's visit. Orders:  -     FLU (FLUAD QUAD INFLUENZA VACCINE,QUAD,ADJUVANTED)         Follow-up and Dispositions    · Return in about 1 year (around 11/22/2022) for 30 minute [facility]/ ACP. Tamara Boucher MD   This is an Initial Medicare Annual Wellness Exam (AWV) (Performed 12 months after IPPE or effective date of Medicare Part B enrollment, Once in a lifetime)    I have reviewed the patient's medical history in detail and updated the computerized patient record. Assessment/Plan   Education and counseling provided:  Are appropriate based on today's review and evaluation    1. Advanced directives, counseling/discussion  -     ADVANCE CARE PLANNING FIRST 30 MINS  2. Initial Medicare annual wellness visit  -     ADVANCE CARE PLANNING FIRST 30 MINS       Depression Risk Factor Screening     3 most recent PHQ Screens 5/21/2021   Little interest or pleasure in doing things Several days   Feeling down, depressed, irritable, or hopeless Several days   Total Score PHQ 2 2       Alcohol Risk Screen    Do you average more than 1 drink per night or more than 7 drinks a week:  No    On any one occasion in the past three months have you have had more than 3 drinks containing alcohol:  No         Functional Ability and Level of Safety    Hearing: Hearing is good. Activities of Daily Living: The home contains: no safety equipment. Patient does total self care     Ambulation: with no difficulty      Fall Risk:  Fall Risk Assessment, last 12 mths 11/22/2021   Able to walk? Yes   Fall in past 12 months? 0   Do you feel unsteady?  0   Are you worried about falling 0      Abuse Screen:  Patient is not abused       Cognitive Screening    Has your family/caregiver stated any concerns about your memory: no     Cognitive Screening: Normal - Mini Cog Test    Health Maintenance Due     Health Maintenance Due   Topic Date Due    DTaP/Tdap/Td series (1 - Tdap) Never done    Shingrix Vaccine Age 50> (1 of 2) Never done    Bone Densitometry (Dexa) Screening  11/29/2020    Flu Vaccine (1) 09/01/2021       Patient Care Team   Patient Care Team:  Donny Sams MD as PCP - General (Internal Medicine)  Donny Sams MD as PCP - [facility]  Shruthi Dupree MD as Surgeon (Surgical Oncology)  Maile Jaramillo, [id number] (Physician Assistant)  Nabil Joyner MD (Vascular Surgery)    History     Patient Active Problem List   Diagnosis Code    Dermatitis L30.9    Benign hematuria N02.9    Palpitations R00.2    Depressive disorder F32.9    Pure hypercholesterolemia E78.00    Venous (peripheral) insufficiency I87.2    Varicose veins of leg with pain, left N25.401    Mixed hyperlipidemia E78.2    Anxiety, generalized F41.1     Past Medical History:   Diagnosis Date    Anxiety, generalized     Benign hematuria     Depressive disorder     Dermatitis     Palpitations     Pure hypercholesterolemia       Past Surgical History:   Procedure Laterality Date    COLONOSCOPY N/A 8/8/2018    COLONOSCOPY / [facility]pect[facility]COPY    HX HYSTERECTOMY      age 45     Current Outpatient Medications   Medication Sig Dispense Refill    propranolol LA (INDERAL LA) 80 mg SR capsule Take 1 capsule by mouth once daily 90 Capsule 1    escitalopram oxalate (Lexapro) 20 mg tablet Take 1 Tablet by mouth daily. 60 Tablet 2    LORazepam (ATIVAN) 0.5 mg tablet Take 1 mg by mouth three (3) times daily.       fluticasone (FLONASE) 50 mcg/actuation nasal spray 2 spray each nostril daily 1 Bottle 1     Allergies   Allergen Reactions    Latex Rash and Contact Dermatitis    Statins-Hmg-Coa Reductase Inhibitors Myalgia       Family History   Problem Relation Age of Onset   Saige Montes Stroke Mother     Cancer Mother     Heart Disease Father     Diabetes Father     Hypertension Sister     Colon Cancer Sister         diagnosed age 58     Social History     Tobacco Use    Smoking status: Former Smoker     Packs/day: 1.00     Years: 30.00     Pack years: 30.00     Start date:      Quit date:      Years since quittin.9    Smokeless tobacco: Never Used   Substance Use Topics    Alcohol use: Yes     Comment: occasional       Kalyn Worthy MD

## 2021-11-22 NOTE — ACP (ADVANCE CARE PLANNING)
Advance Care Planning     Advance Care Planning (ACP) Physician/NP/PA Conversation      Date of Conversation: 11/22/2021  Conducted with: Patient with Decision Making Capacity    Healthcare Decision Maker:   No healthcare decision makers have been documented. Click here to complete [address] including selection of the Healthcare Decision Maker Relationship (ie \"Primary\")      Today we documented Decision Maker(s) consistent with Legal Next of Kin hierarchy. Care Preferences:    Hospitalization: \"If your health worsens and it becomes clear that your chance of recovery is unlikely, what would be your preference regarding hospitalization? \"  The patient would prefer comfort-focused treatment without hospitalization. Ventilation: \"If you were unable to breathe on your own and your chance of recovery was unlikely, what would be your preference about the use of a ventilator (breathing machine) if it was available to you? \"   The patient would NOT desire the use of a ventilator. Resuscitation: \"In the event your heart stopped as a result of an underlying serious health condition, would you want attempts to be made to restart your heart, or would you prefer a natural death? \"   Yes, attempt to resuscitate.     Additional topics discussed: artificial nutrition, ventilation preferences, hospitalization preferences and resuscitation preferences    Conversation Outcomes / Follow-Up Plan:   ACP complete - no further action today  Reviewed DNR/DNI and patient elects Full Code (Attempt Resuscitation)     Length of Voluntary ACP Conversation in minutes:  16 minutes    Sona Moya MD

## 2021-11-22 NOTE — PROGRESS NOTES
Clemencia Hammer presents today for   Chief Complaint   Patient presents with    Complete Physical       Is someone accompanying this pt? no    Is the patient using any DME equipment during OV? no    Depression Screening:  3 most recent PHQ Screens 5/21/2021   Little interest or pleasure in doing things Several days   Feeling down, depressed, irritable, or hopeless Several days   Total Score PHQ 2 2       Learning Assessment:  Learning Assessment 2/22/2018   PRIMARY LEARNER Patient   BARRIERS PRIMARY LEARNER -   CO-LEARNER CAREGIVER -   PRIMARY LANGUAGE ENGLISH   LEARNER PREFERENCE PRIMARY LISTENING     -   ANSWERED BY patient   RELATIONSHIP SELF       Abuse Screening:  Abuse Screening Questionnaire 5/21/2021   Do you ever feel afraid of your partner? N   Are you in a relationship with someone who physically or mentally threatens you? N   Is it safe for you to go home? Y       Fall Risk  Fall Risk Assessment, last 12 mths 5/21/2021   Able to walk? Yes   Fall in past 12 months? 0   Do you feel unsteady? 0   Are you worried about falling 0       ADL  ADL Assessment 5/21/2021   Feeding yourself No Help Needed   Getting from bed to chair No Help Needed   Getting dressed No Help Needed   Bathing or showering No Help Needed   Walk across the room (includes cane/walker) No Help Needed   Using the telphone No Help Needed   Taking your medications No Help Needed   Preparing meals No Help Needed   Managing money (expenses/bills) No Help Needed   Moderately strenuous housework (laundry) No Help Needed   Shopping for personal items (toiletries/medicines) No Help Needed   Shopping for groceries No Help Needed   Driving No Help Needed   Climbing a flight of stairs No Help Needed   Getting to places beyond walking distances No Help Needed       Health Maintenance reviewed and discussed and ordered per Provider.     Health Maintenance Due   Topic Date Due    DTaP/Tdap/Td series (1 - Tdap) Never done    Shingrix Vaccine Age 50> (1 of 2) Never done    Bone Densitometry (Dexa) Screening  11/29/2020    Flu Vaccine (1) 09/01/2021    Medicare Yearly Exam  11/15/2021   . Coordination of Care:  1. Have you been to the ER, urgent care clinic since your last visit? Hospitalized since your last visit? no    2. Have you seen or consulted any other health care providers outside of the [address] [place] [address] since your last visit? Include any pap smears or colon screening. no    3. For patients aged 39-70: Has the patient had a colonoscopy? Yes, HM satisfied with blue hyperlink     If the patient is female:    4. For patients aged 41-77: Has the patient had a mammogram within the past 2 years? Yes, HM satisfied with blue hyperlink    5. For patients aged 21-65: Has the patient had a pap smear? N/A    Bulmaro Mera is a 72 y.o. female who presents for routine immunizations. She denies any symptoms , reactions or allergies that would exclude them from being immunized today. Risks and adverse reactions were discussed and the VIS was given to them. All questions were addressed. She was observed for 10 min post injection. There were no reactions observed. Verbal order for kristina IM received per Shivani Perrin MD for pt Bulmaro Mera. Order written down and read back to provider for verification prior to completion.

## 2021-11-22 NOTE — PATIENT INSTRUCTIONS
Medicare Wellness Visit, Female     The best way to live healthy is to have a lifestyle where you eat a well-balanced diet, exercise regularly, limit alcohol use, and quit all forms of tobacco/nicotine, if applicable. Regular preventive services are another way to keep healthy. Preventive services (vaccines, screening tests, monitoring & exams) can help personalize your care plan, which helps you manage your own care. Screening tests can find health problems at the earliest stages, when they are easiest to treat. [facility] follows the current, evidence-based guidelines published by the [facility] Bucky Piña ([facility]) when recommending preventive services for our patients. Because we follow these guidelines, sometimes recommendations change over time as research supports it. (For example, mammograms used to be recommended annually. Even though Medicare will still pay for an annual mammogram, the newer guidelines recommend a mammogram every two years for women of average risk). Of course, you and your doctor may decide to screen more often for some diseases, based on your risk and your co-morbidities (chronic disease you are already diagnosed with). Preventive services for you include:  - Medicare offers their members a free annual wellness visit, which is time for you and your primary care provider to discuss and plan for your preventive service needs. Take advantage of this benefit every year!  -All adults over the age of 72 should receive the recommended pneumonia vaccines. Current USPSTF guidelines recommend a series of two vaccines for the best pneumonia protection.   -All adults should have a flu vaccine yearly and a tetanus vaccine every 10 years.   -All adults age 48 and older should receive the shingles vaccines (series of two vaccines).       -All adults age 38-68 who are overweight should have a diabetes screening test once every three years.   -All adults born between 80 and 1965 should be screened once for Hepatitis C.  -Other screening tests and preventive services for persons with diabetes include: an eye exam to screen for diabetic retinopathy, a kidney function test, a foot exam, and stricter control over your cholesterol.   -Cardiovascular screening for adults with routine risk involves an electrocardiogram (ECG) at intervals determined by your doctor.   -Colorectal cancer screenings should be done for adults age 54-65 with no increased risk factors for colorectal cancer. There are a number of acceptable methods of screening for this type of cancer. Each test has its own benefits and drawbacks. Discuss with your doctor what is most appropriate for you during your annual wellness visit. The different tests include: colonoscopy (considered the best screening method), a fecal occult blood test, a fecal DNA test, and sigmoidoscopy.    -A bone mass density test is recommended when a woman turns 65 to screen for osteoporosis. This test is only recommended one time, as a screening. Some providers will use this same test as a disease monitoring tool if you already have osteoporosis. -Breast cancer screenings are recommended every other year for women of normal risk, age 54-69.  -Cervical cancer screenings for women over age 72 are only recommended with certain risk factors.      Here is a list of your current Health Maintenance items (your personalized list of preventive services) with a due date:  Health Maintenance Due   Topic Date Due    DTaP/Tdap/Td  (1 - Tdap) Never done    Shingles Vaccine (1 of 2) Never done    Bone Mineral Density   11/29/2020    COVID-19 Vaccine (4 - Booster for Pfizer series) 03/04/2022    Depression Monitoring  05/21/2022    Yearly Flu Vaccine (1) 08/01/2022    Mammogram  08/11/2022         Medicare Wellness Visit, Female     The best way to live healthy is to have a lifestyle where you eat a well-balanced diet, exercise regularly, limit alcohol use, and quit all forms of tobacco/nicotine, if applicable. Regular preventive services are another way to keep healthy. Preventive services (vaccines, screening tests, monitoring & exams) can help personalize your care plan, which helps you manage your own care. Screening tests can find health problems at the earliest stages, when they are easiest to treat. [facility] follows the current, evidence-based guidelines published by the [facility] Bucky Ayana ([facility]) when recommending preventive services for our patients. Because we follow these guidelines, sometimes recommendations change over time as research supports it. (For example, mammograms used to be recommended annually. Even though Medicare will still pay for an annual mammogram, the newer guidelines recommend a mammogram every two years for women of average risk). Of course, you and your doctor may decide to screen more often for some diseases, based on your risk and your co-morbidities (chronic disease you are already diagnosed with). Preventive services for you include:  - Medicare offers their members a free annual wellness visit, which is time for you and your primary care provider to discuss and plan for your preventive service needs. Take advantage of this benefit every year!  -All adults over the age of 72 should receive the recommended pneumonia vaccines. Current USPSTF guidelines recommend a series of two vaccines for the best pneumonia protection.   -All adults should have a flu vaccine yearly and a tetanus vaccine every 10 years.   -All adults age 48 and older should receive the shingles vaccines (series of two vaccines).       -All adults age 38-68 who are overweight should have a diabetes screening test once every three years.   -All adults born between 80 and 1965 should be screened once for Hepatitis C.  -Other screening tests and preventive services for persons with diabetes include: an eye exam to screen for diabetic retinopathy, a kidney function test, a foot exam, and stricter control over your cholesterol.   -Cardiovascular screening for adults with routine risk involves an electrocardiogram (ECG) at intervals determined by your doctor.   -Colorectal cancer screenings should be done for adults age 54-65 with no increased risk factors for colorectal cancer. There are a number of acceptable methods of screening for this type of cancer. Each test has its own benefits and drawbacks. Discuss with your doctor what is most appropriate for you during your annual wellness visit. The different tests include: colonoscopy (considered the best screening method), a fecal occult blood test, a fecal DNA test, and sigmoidoscopy.    -A bone mass density test is recommended when a woman turns 65 to screen for osteoporosis. This test is only recommended one time, as a screening. Some providers will use this same test as a disease monitoring tool if you already have osteoporosis. -Breast cancer screenings are recommended every other year for women of normal risk, age 54-69.  -Cervical cancer screenings for women over age 72 are only recommended with certain risk factors. Here is a list of your current Health Maintenance items (your personalized list of preventive services) with a due date:  Health Maintenance Due   Topic Date Due    DTaP/Tdap/Td  (1 - Tdap) Never done    Shingles Vaccine (1 of 2) Never done    Bone Mineral Density   11/29/2020    COVID-19 Vaccine (4 - Booster for Pfizer series) 03/04/2022    Depression Monitoring  05/21/2022    Yearly Flu Vaccine (1) 08/01/2022    Mammogram  08/11/2022         Vaccine Information Statement    Influenza (Flu) Vaccine (Inactivated or Recombinant): What You Need to Know    Many vaccine information statements are available in [place] and other languages. See www.immunize.org/vis.   Hojas de información sobre vacunas están disponibles en español y en muchos otros idiomas. Visite www.immunize.org/vis. 1. Why get vaccinated? Influenza vaccine can prevent influenza (flu). Flu is a contagious disease that spreads around the [place] every year, usually between October and May. Anyone can get the flu, but it is more dangerous for some people. Infants and young children, people 72 years and older, pregnant people, and people with certain health conditions or a weakened immune system are at greatest risk of flu complications. Pneumonia, bronchitis, sinus infections, and ear infections are examples of flu-related complications. If you have a medical condition, such as heart disease, cancer, or diabetes, flu can make it worse. Flu can cause fever and chills, sore throat, muscle aches, fatigue, cough, headache, and runny or stuffy nose. Some people may have vomiting and diarrhea, though this is more common in children than adults. In an average year, thousands of people in the [facility] die from flu, and many more are hospitalized. Flu vaccine prevents millions of illnesses and flu-related visits to the doctor each year. 2. Influenza vaccines     CDC recommends everyone 6 months and older get vaccinated every flu season. Children 6 months through 6years of age may need 2 doses during a single flu season. Everyone else needs only 1 dose each flu season. It takes about 2 weeks for protection to develop after vaccination. There are many flu viruses, and they are always changing. Each year a new flu vaccine is made to protect against the influenza viruses believed to be likely to cause disease in the upcoming flu season. Even when the vaccine doesnt exactly match these viruses, it may still provide some protection. Influenza vaccine does not cause flu. Influenza vaccine may be given at the same time as other vaccines.     3. Talk with your health care provider    Tell your vaccination provider if the person getting the vaccine:  Has had an allergic reaction after a previous dose of influenza vaccine, or has any severe, life-threatening allergies   Has ever had Guillain-Barré Syndrome (also called GBS)    In some cases, your health care provider may decide to postpone influenza vaccination until a future visit. Influenza vaccine can be administered at any time during pregnancy. People who are or will be pregnant during influenza season should receive inactivated influenza vaccine. People with minor illnesses, such as a cold, may be vaccinated. People who are moderately or severely ill should usually wait until they recover before getting influenza vaccine. Your health care provider can give you more information. 4. Risks of a vaccine reaction    Soreness, redness, and swelling where the shot is given, fever, muscle aches, and headache can happen after influenza vaccination. There may be a very small increased risk of Guillain-Barré Syndrome (GBS) after inactivated influenza vaccine (the flu shot). [facility] children who get the flu shot along with pneumococcal vaccine (PCV13) and/or DTaP vaccine at the same time might be slightly more likely to have a seizure caused by fever. Tell your health care provider if a child who is getting flu vaccine has ever had a seizure. People sometimes faint after medical procedures, including vaccination. Tell your provider if you feel dizzy or have vision changes or ringing in the ears. As with any medicine, there is a very remote chance of a vaccine causing a severe allergic reaction, other serious injury, or death. 5. What if there is a serious problem? An allergic reaction could occur after the vaccinated person leaves the clinic.  If you see signs of a severe allergic reaction (hives, swelling of the face and throat, difficulty breathing, a fast heartbeat, dizziness, or weakness), call 9-1-1 and get the person to the nearest hospital.    For other signs that concern you, call your health care provider. Adverse reactions should be reported to the Vaccine Adverse Event Reporting System (VAERS). Your health care provider will usually file this report, or you can do it yourself. Visit the VAERS website at www.vaers. [facility].gov or call 1-175.623.6089. VAERS is only for reporting reactions, and VAERS staff members do not give medical advice. 6. The National Vaccine Injury Compensation Program    The [facility] Vaccine Injury Compensation Program (VICP) is a federal program that was created to compensate people who may have been injured by certain vaccines. Claims regarding alleged injury or death due to vaccination have a time limit for filing, which may be as short as two years. Visit the VICP website at www.[facility].gov/vaccinecompensation or call 9-881.809.2508 to learn about the program and about filing a claim. 7. How can I learn more? Ask your health care provider. Call your local or state health department. Visit the website of the Food and Drug Administration (FDA) for vaccine package inserts and additional information at www.fda.gov/vaccines-blood-biologics/vaccines. Contact the Centers for Disease Control and Prevention (CDC): Call 7-333.734.3737 (1-800-CDC-INFO) or  Visit CDCs influenza website at www.cdc.gov/flu. Vaccine Information Statement   Inactivated Influenza Vaccine   8/6/2021  42 [facility] [id number]-[id number]   Department of Health and Human Services  Centers for Disease Control and Prevention    Office Use Only

## 2021-11-26 ENCOUNTER — TELEPHONE (OUTPATIENT)
Dept: INTERNAL MEDICINE CLINIC | Age: 66
End: 2021-11-26

## 2021-11-26 NOTE — TELEPHONE ENCOUNTER
Pt calling asking to speak to Dr. Yohannes Patterson or nurse Says she gave Dr. Yohannes Patterson information last visit about a living will and advance directive. She wants to retract the information she gave. Wants to know how she can do that?

## 2021-11-26 NOTE — TELEPHONE ENCOUNTER
Pt calling again.  She is asking instead of returning call to her please call her daughter Jane Aviles at [phone]

## 2021-11-27 ENCOUNTER — TELEPHONE (OUTPATIENT)
Dept: INTERNAL MEDICINE CLINIC | Age: 66
End: 2021-11-27

## 2021-11-30 NOTE — TELEPHONE ENCOUNTER
Spoke with Daughter Jose L [facility]) she is aware all patient's get a depression screen completed at every visit. If the provider feels the patient is at risk of harming themselves or others they will be taken to the ED by Police for eval.  Daughter verbalizes understanding.

## 2021-12-01 NOTE — TELEPHONE ENCOUNTER
If daughter or  patient call back, see my telephone note about my conversation with the patient on 11/26.

## 2021-12-06 DIAGNOSIS — R07.89 OTHER CHEST PAIN: ICD-10-CM

## 2022-02-01 DIAGNOSIS — R00.2 PALPITATIONS: ICD-10-CM

## 2022-02-01 RX ORDER — PROPRANOLOL HYDROCHLORIDE 80 MG/1
80 CAPSULE, EXTENDED RELEASE ORAL DAILY
Qty: 90 CAPSULE | Refills: 3 | Status: SHIPPED | OUTPATIENT
Start: 2022-02-01

## 2022-02-01 NOTE — TELEPHONE ENCOUNTER
Last Visit: 11/22/21 with MD Elsa Wang  Next Appointment: Brittni Delgado to follow-up in 1 year  Previous Refill Encounter(s): 7/26/21 #90 with 1 refill    Requested Prescriptions     Pending Prescriptions Disp Refills    propranolol LA (INDERAL LA) 80 mg SR capsule 90 Capsule 3     Sig: Take 1 Capsule by mouth daily.

## 2022-03-18 PROBLEM — I87.2 VENOUS (PERIPHERAL) INSUFFICIENCY: Status: ACTIVE | Noted: 2018-01-16

## 2022-03-18 PROBLEM — I83.812 VARICOSE VEINS OF LEG WITH PAIN, LEFT: Status: ACTIVE | Noted: 2018-05-01

## 2022-03-19 PROBLEM — E78.2 MIXED HYPERLIPIDEMIA: Status: ACTIVE | Noted: 2020-03-10

## 2022-09-19 ENCOUNTER — TELEPHONE (OUTPATIENT)
Dept: INTERNAL MEDICINE CLINIC | Age: 67
End: 2022-09-19

## 2022-09-19 DIAGNOSIS — E78.00 PURE HYPERCHOLESTEROLEMIA: ICD-10-CM

## 2022-09-19 DIAGNOSIS — N02.9 BENIGN HEMATURIA: ICD-10-CM

## 2022-09-19 DIAGNOSIS — R00.2 PALPITATIONS: Primary | ICD-10-CM

## 2022-10-20 ENCOUNTER — TRANSCRIBE ORDER (OUTPATIENT)
Dept: SCHEDULING | Age: 67
End: 2022-10-20

## 2022-10-20 DIAGNOSIS — Z12.31 ENCOUNTER FOR SCREENING MAMMOGRAM FOR BREAST CANCER: Primary | ICD-10-CM

## 2022-11-04 ENCOUNTER — HOSPITAL ENCOUNTER (OUTPATIENT)
Dept: MAMMOGRAPHY | Age: 67
Discharge: HOME | End: 2022-11-04
Attending: INTERNAL MEDICINE
Payer: MEDICARE

## 2022-11-04 DIAGNOSIS — Z12.31 ENCOUNTER FOR SCREENING MAMMOGRAM FOR BREAST CANCER: ICD-10-CM

## 2022-11-04 PROCEDURE — 77063 BREAST TOMOSYNTHESIS BI: CPT

## 2022-11-21 ENCOUNTER — HOSPITAL ENCOUNTER (OUTPATIENT)
Dept: LAB | Age: 67
Discharge: HOME | End: 2022-11-21
Payer: MEDICARE

## 2022-11-21 ENCOUNTER — APPOINTMENT (OUTPATIENT)
Dept: INTERNAL MEDICINE CLINIC | Age: 67
End: 2022-11-21

## 2022-11-21 DIAGNOSIS — N02.9 BENIGN HEMATURIA: ICD-10-CM

## 2022-11-21 DIAGNOSIS — E78.00 PURE HYPERCHOLESTEROLEMIA: ICD-10-CM

## 2022-11-21 DIAGNOSIS — R00.2 PALPITATIONS: ICD-10-CM

## 2022-11-21 LAB
ALBUMIN SERPL-MCNC: 3.8 G/DL (ref 3.4–5)
ALBUMIN/GLOB SERPL: 1.3 {RATIO} (ref 0.8–1.7)
ALP SERPL-CCNC: 95 U/L (ref 45–117)
ALT SERPL-CCNC: 23 U/L (ref 13–56)
ANION GAP SERPL CALC-SCNC: 5 MMOL/L (ref 3–18)
APPEARANCE UR: CLEAR
AST SERPL-CCNC: 15 U/L (ref 10–38)
BILIRUB SERPL-MCNC: 0.5 MG/DL (ref 0.2–1)
BILIRUB UR QL: NEGATIVE
BUN SERPL-MCNC: 14 MG/DL (ref 7–18)
BUN/CREAT SERPL: 17 (ref 12–20)
CALCIUM SERPL-MCNC: 9.1 MG/DL (ref 8.5–10.1)
CHLORIDE SERPL-SCNC: 103 MMOL/L (ref 100–111)
CHOLEST SERPL-MCNC: 269 MG/DL
CO2 SERPL-SCNC: 29 MMOL/L (ref 21–32)
COLOR UR: YELLOW
CREAT SERPL-MCNC: 0.84 MG/DL (ref 0.6–1.3)
GLOBULIN SER CALC-MCNC: 2.9 G/DL (ref 2–4)
GLUCOSE SERPL-MCNC: 90 MG/DL (ref 74–99)
GLUCOSE UR STRIP.AUTO-MCNC: NEGATIVE MG/DL
HDLC SERPL-MCNC: 50 MG/DL (ref 40–60)
HDLC SERPL: 5.4 {RATIO} (ref 0–5)
HGB UR QL STRIP: NEGATIVE
KETONES UR QL STRIP.AUTO: NEGATIVE MG/DL
LDLC SERPL CALC-MCNC: 179.2 MG/DL (ref 0–100)
LEUKOCYTE ESTERASE UR QL STRIP.AUTO: NEGATIVE
LIPID PROFILE,FLP: ABNORMAL
NITRITE UR QL STRIP.AUTO: NEGATIVE
PH UR STRIP: 5.5 [PH] (ref 5–8)
POTASSIUM SERPL-SCNC: 4.5 MMOL/L (ref 3.5–5.5)
PROT SERPL-MCNC: 6.7 G/DL (ref 6.4–8.2)
PROT UR STRIP-MCNC: NEGATIVE MG/DL
SODIUM SERPL-SCNC: 137 MMOL/L (ref 136–145)
SP GR UR REFRACTOMETRY: 1.02 (ref 1–1.03)
TRIGL SERPL-MCNC: 199 MG/DL (ref ?–150)
UROBILINOGEN UR QL STRIP.AUTO: 0.2 EU/DL (ref 0.2–1)
VLDLC SERPL CALC-MCNC: 39.8 MG/DL

## 2022-11-21 PROCEDURE — 36415 COLL VENOUS BLD VENIPUNCTURE: CPT

## 2022-11-21 PROCEDURE — 81003 URINALYSIS AUTO W/O SCOPE: CPT

## 2022-11-21 PROCEDURE — 80053 COMPREHEN METABOLIC PANEL: CPT

## 2022-11-21 PROCEDURE — 80061 LIPID PANEL: CPT

## 2022-11-28 ENCOUNTER — OFFICE VISIT (OUTPATIENT)
Dept: INTERNAL MEDICINE CLINIC | Age: 67
End: 2022-11-28
Payer: MEDICARE

## 2022-11-28 VITALS
HEART RATE: 56 BPM | SYSTOLIC BLOOD PRESSURE: 116 MMHG | BODY MASS INDEX: 26.61 KG/M2 | OXYGEN SATURATION: 98 % | HEIGHT: 66 IN | DIASTOLIC BLOOD PRESSURE: 73 MMHG | WEIGHT: 165.6 LBS | TEMPERATURE: 97.4 F | RESPIRATION RATE: 18 BRPM

## 2022-11-28 DIAGNOSIS — Z00.00 MEDICARE ANNUAL WELLNESS VISIT, SUBSEQUENT: ICD-10-CM

## 2022-11-28 DIAGNOSIS — R00.2 PALPITATIONS: ICD-10-CM

## 2022-11-28 DIAGNOSIS — E78.00 PURE HYPERCHOLESTEROLEMIA: ICD-10-CM

## 2022-11-28 DIAGNOSIS — S41.102A OPEN WOUND OF LEFT UPPER ARM, INITIAL ENCOUNTER: ICD-10-CM

## 2022-11-28 DIAGNOSIS — Z71.89 ADVANCED DIRECTIVES, COUNSELING/DISCUSSION: Primary | ICD-10-CM

## 2022-11-28 PROCEDURE — 99213 OFFICE O/P EST LOW 20 MIN: CPT | Performed by: INTERNAL MEDICINE

## 2022-11-28 PROCEDURE — G9899 SCRN MAM PERF RSLTS DOC: HCPCS | Performed by: INTERNAL MEDICINE

## 2022-11-28 PROCEDURE — G8399 PT W/DXA RESULTS DOCUMENT: HCPCS | Performed by: INTERNAL MEDICINE

## 2022-11-28 PROCEDURE — G0439 PPPS, SUBSEQ VISIT: HCPCS | Performed by: INTERNAL MEDICINE

## 2022-11-28 PROCEDURE — 90714 TD VACC NO PRESV 7 YRS+ IM: CPT | Performed by: INTERNAL MEDICINE

## 2022-11-28 PROCEDURE — G0463 HOSPITAL OUTPT CLINIC VISIT: HCPCS | Performed by: INTERNAL MEDICINE

## 2022-11-28 PROCEDURE — 99497 ADVNCD CARE PLAN 30 MIN: CPT | Performed by: INTERNAL MEDICINE

## 2022-11-28 PROCEDURE — G8417 CALC BMI ABV UP PARAM F/U: HCPCS | Performed by: INTERNAL MEDICINE

## 2022-11-28 PROCEDURE — 1101F PT FALLS ASSESS-DOCD LE1/YR: CPT | Performed by: INTERNAL MEDICINE

## 2022-11-28 PROCEDURE — 1123F ACP DISCUSS/DSCN MKR DOCD: CPT | Performed by: INTERNAL MEDICINE

## 2022-11-28 PROCEDURE — G8427 DOCREV CUR MEDS BY ELIG CLIN: HCPCS | Performed by: INTERNAL MEDICINE

## 2022-11-28 PROCEDURE — 3017F COLORECTAL CA SCREEN DOC REV: CPT | Performed by: INTERNAL MEDICINE

## 2022-11-28 PROCEDURE — G9717 DOC PT DX DEP/BP F/U NT REQ: HCPCS | Performed by: INTERNAL MEDICINE

## 2022-11-28 PROCEDURE — G8536 NO DOC ELDER MAL SCRN: HCPCS | Performed by: INTERNAL MEDICINE

## 2022-11-28 PROCEDURE — 1090F PRES/ABSN URINE INCON ASSESS: CPT | Performed by: INTERNAL MEDICINE

## 2022-11-28 RX ORDER — PROPRANOLOL HYDROCHLORIDE 80 MG/1
80 CAPSULE, EXTENDED RELEASE ORAL DAILY
Qty: 90 CAPSULE | Refills: 3 | Status: SHIPPED | OUTPATIENT
Start: 2022-11-28

## 2022-11-28 NOTE — PROGRESS NOTES
Josette Ramirez is a 77 y.o. female who presents for routine immunizations. She denies any symptoms , reactions or allergies that would exclude them from being immunized today. Risks and adverse reactions were discussed and the VIS was given to them. All questions were addressed. She was observed for 5 min post injection. There were no reactions observed. Verbal order for Flu IM received per Minna Beauchamp MD for pt Josette Ramirez. Order written down and read back to provider for verification prior to completion.

## 2022-11-28 NOTE — PROGRESS NOTES
Chief Complaint   Patient presents with    Annual Wellness Visit         1. \"Have you been to the ER, urgent care clinic since your last visit? Hospitalized since your last visit? \" No    2. \"Have you seen or consulted any other health care providers outside of the [address] [place] [address] since your last visit? \" No    3. For patients aged 39-70: Has the patient had a colonoscopy? Yes, no care gap present    If the patient is female:    4. For patients aged 41-77: Has the patient had a mammogram within the past 2 years? Yes, no care present    5. For patients aged 21-65: Has the patient had a pap smear?  Na, based on age

## 2022-11-28 NOTE — PROGRESS NOTES
HPI     Rima Vance is here for Medicare wellness/advanced care planning visit. She has several scratch marks on her arms from her dogs, and there is no documented tetanus shot in her record, so she was given a dT at today's visit after discussion. She has no palpitations as long as she takes her Inderal regularly. No new family history reported        Past Medical History:   Diagnosis Date    Anxiety, generalized     Benign hematuria     Depressive disorder     Dermatitis     Palpitations     Pure hypercholesterolemia         Past Surgical History:   Procedure Laterality Date    COLONOSCOPY N/A 2018    COLONOSCOPY / polypectomy performed by Myke Lim MD at [facility] ENDOSCOPY    HX HYSTERECTOMY      age 45        Current Outpatient Medications   Medication Sig Dispense Refill    propranolol LA (INDERAL LA) 80 mg SR capsule Take 1 Capsule by mouth daily. 90 Capsule 3    escitalopram oxalate (Lexapro) 20 mg tablet Take 1 Tablet by mouth daily. 60 Tablet 2    LORazepam (ATIVAN) 0.5 mg tablet Take 1 mg by mouth three (3) times daily.  Pt only taking two times daily          Allergies   Allergen Reactions    Latex Rash and Contact Dermatitis    Statins-Hmg-Coa Reductase Inhibitors Myalgia        Social History     Socioeconomic History    Marital status:      Spouse name: Not on file    Number of children: Not on file    Years of education: Not on file    Highest education level: Not on file   Occupational History    Not on file   Tobacco Use    Smoking status: Former     Packs/day: 1.00     Years: 30.00     Pack years: 30.00     Types: Cigarettes     Start date:      Quit date:      Years since quittin.9    Smokeless tobacco: Never   Substance and Sexual Activity    Alcohol use: Yes     Comment: occasional    Drug use: Not on file    Sexual activity: Not on file   Other Topics Concern    Not on file   Social History Narrative    Not on file     Social Determinants of Health     Financial Resource Strain: Not on file   Food Insecurity: Not on file   Transportation Needs: Not on file   Physical Activity: Not on file   Stress: Not on file   Social Connections: Not on file   Intimate Partner Violence: Not on file   Housing Stability: Not on file        Family History   Problem Relation Age of Onset    Stroke Mother     Cancer Mother     Heart Disease Father     Diabetes Father     Hypertension Sister     Colon Cancer Sister         diagnosed age 58           Visit Vitals  /73 (BP 1 Location: Left arm, BP Patient Position: Sitting, BP Cuff Size: Large adult)   Pulse (!) 56   Temp 97.4 °F (36.3 °C) (Temporal)   Resp 18   Ht 5' 6\" (1.676 m)   Wt 165 lb 9.6 oz (75.1 kg)   SpO2 98%   BMI 26.73 kg/m²        Review of Systems   Eyes:  Negative for blurred vision. Respiratory:  Negative for shortness of breath. Cardiovascular:  Negative for chest pain and palpitations. Gastrointestinal:  Negative for blood in stool. Genitourinary:  Negative for hematuria. No vaginal bleeding   Psychiatric/Behavioral:  Negative for depression. The patient is not nervous/anxious. Physical Exam  Constitutional:       General: She is not in acute distress. HENT:      Right Ear: Tympanic membrane, ear canal and external ear normal.      Left Ear: Tympanic membrane, ear canal and external ear normal.   Eyes:      General: No scleral icterus. Conjunctiva/sclera: Conjunctivae normal.   Neck:      Comments: Carotid upstrokes normal to palpation. Lymph: No cervical, supraclavicular, or axillary lymphadenopathy. Cardiovascular:      Rate and Rhythm: Normal rate and regular rhythm. Pulses: Normal pulses. Heart sounds: No murmur heard. No friction rub. No gallop. Pulmonary:      Effort: Pulmonary effort is normal.      Breath sounds: Normal breath sounds. Abdominal:      General: Abdomen is flat. Palpations: Abdomen is soft. Tenderness:  There is no abdominal tenderness. Genitourinary:     Comments: Breasts: No mass, discharge, or skin changes bilaterally. Musculoskeletal:      Cervical back: Neck supple. Comments: No clubbing, cyanosis, or edema. Calves nontender, no cords. Skin:     General: Skin is warm and dry. Neurological:      Mental Status: She is alert and oriented to person, place, and time. Psychiatric:         Mood and Affect: Mood normal.                Diagnoses and all orders for this visit:    1. Advanced directives, counseling/discussion  -     ADVANCE CARE PLANNING FIRST 30 MINS    2. Medicare annual wellness visit, subsequent  Comments:  Normal breast exam, recent mammogram.  Up-to-date colonoscopy. Orders:  -     ADVANCE CARE PLANNING FIRST 30 MINS    3. Palpitations  Comments:  Beta-blocker. Check chemistries with next labs  Orders:  -     propranolol LA (INDERAL LA) 80 mg SR capsule; Take 1 Capsule by mouth daily.  -     METABOLIC PANEL, COMPREHENSIVE; Future    4. Pure hypercholesterolemia  Comments:  Current [place] 10-year MI risk low at 3.4%, recheck yearly  Orders:  -     LIPID PANEL; Future    5. Open wound of left upper arm, initial encounter  Comments:  scratches from her dogs. dT given  Orders:  -     TD, Tanikastaciamariah Adamew, (AGE 7 YRS+), IM, PF         Follow-up and Dispositions    Return in about 1 year (around 11/28/2023) for [facility] ACP-- labs 1 week before. Spring Beard MD   This is the Subsequent Medicare Annual Wellness Exam, performed 12 months or more after the Initial AWV or the last Subsequent AWV    I have reviewed the patient's medical history in detail and updated the computerized patient record. Assessment/Plan   Education and counseling provided:  Are appropriate based on today's review and evaluation    1. Advanced directives, counseling/discussion  -     ADVANCE CARE PLANNING FIRST 30 MINS  2.  Medicare annual wellness visit, subsequent  -     ADVANCE CARE PLANNING FIRST 30 MINS Depression Risk Factor Screening     3 most recent PHQ Screens 11/28/2022   Little interest or pleasure in doing things Not at all   Feeling down, depressed, irritable, or hopeless Not at all   Total Score PHQ 2 0   Trouble falling or staying asleep, or sleeping too much Not at all   Feeling tired or having little energy Not at all   Poor appetite, weight loss, or overeating Not at all   Feeling bad about yourself - or that you are a failure or have let yourself or your family down Not at all   Trouble concentrating on things such as school, work, reading, or watching TV Not at all   Moving or speaking so slowly that other people could have noticed; or the opposite being so fidgety that others notice Not at all   Thoughts of being better off dead, or hurting yourself in some way Not at all   PHQ 9 Score 0   How difficult have these problems made it for you to do your work, take care of your home and get along with others Not difficult at all       Alcohol & Drug Abuse Risk Screen    Do you average more than 1 drink per night or more than 7 drinks a week:  No    On any one occasion in the past three months have you have had more than 3 drinks containing alcohol:  No          Functional Ability and Level of Safety    Hearing: Hearing is good. Activities of Daily Living: The home contains: grab bars  Patient does total self care      Ambulation: with no difficulty     Fall Risk:  Fall Risk Assessment, last 12 mths 11/28/2022   Able to walk? Yes   Fall in past 12 months? 0   Do you feel unsteady?  0   Are you worried about falling 0      Abuse Screen:  Patient is not abused       Cognitive Screening    Has your family/caregiver stated any concerns about your memory: no     Cognitive Screening: Normal - Mini Cog Test    Health Maintenance Due     Health Maintenance Due   Topic Date Due    DTaP/Tdap/Td series (1 - Tdap) Never done    Bone Densitometry (Dexa) Screening  11/29/2020    Depression Monitoring 05/21/2022       Patient Care Team   Patient Care Team:  Jose Miguel Briggs MD as PCP - General (Internal Medicine Physician)  Jose Miguel Briggs MD as PCP - [facility]  Brooke Shields MD as Surgeon (Surgical Oncology)  Emilia Islas (Physician Assistant)  Aurelio Webster MD (Vascular Surgery)    History     Patient Active Problem List   Diagnosis Code    Dermatitis L30.9    Benign hematuria N02.9    Palpitations R00.2    Depressive disorder F32. A    Pure hypercholesterolemia E78.00    Venous (peripheral) insufficiency I87.2    Varicose veins of leg with pain, left I83.812    Mixed hyperlipidemia E78.2    Anxiety, generalized F41.1     Past Medical History:   Diagnosis Date    Anxiety, generalized     Benign hematuria     Depressive disorder     Dermatitis     Palpitations     Pure hypercholesterolemia       Past Surgical History:   Procedure Laterality Date    COLONOSCOPY N/A 8/8/2018    COLONOSCOPY / polypectomy performed by Derrell Ribeiro [facility] at [facility]OSCOPY    HX HYSTERECTOMY      age 45     Current Outpatient Medications   Medication Sig Dispense Refill    propranolol LA (INDERAL LA) 80 mg SR capsule Take 1 Capsule by mouth daily. 90 Capsule 3    escitalopram oxalate (Lexapro) 20 mg tablet Take 1 Tablet by mouth daily. 60 Tablet 2    LORazepam (ATIVAN) 0.5 mg tablet Take 1 mg by mouth three (3) times daily.  Pt only taking two times daily      fluticasone (FLONASE) 50 mcg/actuation nasal spray 2 spray each nostril daily (Patient not taking: Reported on 11/28/2022) 1 Bottle 1     Allergies   Allergen Reactions    Latex Rash and Contact Dermatitis    Statins-Hmg-Coa Reductase Inhibitors Myalgia       Family History   Problem Relation Age of Onset   [facility] Stroke Mother     Cancer Mother     Heart Disease Father     Diabetes Father     Hypertension Sister     Colon Cancer Sister         diagnosed age 58     Social History     Tobacco Use    Smoking status: Former Packs/day: 1.00     Years: 30.00     Pack years: 30.00     Types: Cigarettes     Start date: 5     Quit date:      Years since quittin.9    Smokeless tobacco: Never   Substance Use Topics    Alcohol use: Yes     Comment: occasional         Donnie Duarte MD

## 2022-11-28 NOTE — ACP (ADVANCE CARE PLANNING)
Advance Care Planning     Advance Care Planning (ACP) Physician/NP/PA Conversation      Date of Conversation: 11/28/2022  Conducted with: Patient with Decision Making Capacity    Healthcare Decision Maker:     Primary Decision Maker: Kendall Davis Spouse - [phone]    Secondary Decision Maker: Bernadine Gitelman - Daughter - [phone]  Click here to complete [address] including selection of the Healthcare Decision Maker Relationship (ie \"Primary\")      Today we documented Decision Maker(s) consistent with Legal Next of Kin hierarchy. Care Preferences:    Hospitalization: \"If your health worsens and it becomes clear that your chance of recovery is unlikely, what would be your preference regarding hospitalization? \"  The patient would prefer hospitalization. Ventilation: \"If you were unable to breathe on your own and your chance of recovery was unlikely, what would be your preference about the use of a ventilator (breathing machine) if it was available to you? \"   The patient is unsure. Resuscitation: \"In the event your heart stopped as a result of an underlying serious health condition, would you want attempts to be made to restart your heart, or would you prefer a natural death? \"   The patient is unsure.     Additional topics discussed: artificial nutrition    Conversation Outcomes / Follow-Up Plan:   ACP in process - information provided, considering goals and options  Reviewed DNR/DNI and patient elects Full Code (Attempt Resuscitation)     Length of Voluntary ACP Conversation in minutes:  16 minutes    Rosie White MD

## 2022-11-28 NOTE — PATIENT INSTRUCTIONS

## 2023-02-03 DIAGNOSIS — R00.2 PALPITATIONS: ICD-10-CM

## 2023-02-03 RX ORDER — PROPRANOLOL HYDROCHLORIDE 80 MG/1
80 CAPSULE, EXTENDED RELEASE ORAL DAILY
Qty: 90 CAPSULE | Refills: 1 | Status: SHIPPED | OUTPATIENT
Start: 2023-02-03

## 2023-02-03 NOTE — TELEPHONE ENCOUNTER
Requested Prescriptions     Pending Prescriptions Disp Refills    propranolol LA (INDERAL LA) 80 mg SR capsule 90 Capsule 3     Sig: Take 1 Capsule by mouth daily.

## 2023-02-04 DIAGNOSIS — R00.2 PALPITATIONS: Primary | ICD-10-CM

## 2023-02-04 DIAGNOSIS — E78.00 PURE HYPERCHOLESTEROLEMIA: Primary | ICD-10-CM

## 2023-08-04 ENCOUNTER — TELEPHONE (OUTPATIENT)
Age: 68
End: 2023-08-04

## 2023-08-04 NOTE — TELEPHONE ENCOUNTER
----- Message from [facility] sent at 8/4/2023 11:11 AM EDT -----  Subject: Refill Request    QUESTIONS  Name of Medication? Other - LORazepam (ATIVAN) 0.5 mg tablet  Patient-reported dosage and instructions? Take 1 mg by mouth three (3)   times daily. Pt only taking two times daily  How many days do you have left? 0  Preferred Pharmacy? [address]  Pharmacy phone number (if available)? [phone]  Additional Information for Provider? Patient said she has been out for a   week and would like a refill on these, patient would like a call back once   sent.  ---------------------------------------------------------------------------  --------------  [address]  What is the best way for the office to contact you? OK to leave message on   voicemail  Preferred Call Back Phone Number? [phone]  ---------------------------------------------------------------------------  --------------  SCRIPT ANSWERS  Relationship to Patient?  Self

## 2023-08-06 DIAGNOSIS — F41.1 GENERALIZED ANXIETY DISORDER: Primary | ICD-10-CM

## 2023-08-06 RX ORDER — LORAZEPAM 0.5 MG/1
0.5 TABLET ORAL 2 TIMES DAILY PRN
Qty: 60 TABLET | Refills: 5 | Status: SHIPPED | OUTPATIENT
Start: 2023-08-06 | End: 2024-02-02

## 2023-08-06 RX ORDER — ESCITALOPRAM OXALATE 20 MG/1
20 TABLET ORAL DAILY
Qty: 90 TABLET | Refills: 1 | Status: SHIPPED | OUTPATIENT
Start: 2023-08-06

## 2023-11-01 ENCOUNTER — TELEPHONE (OUTPATIENT)
Age: 68
End: 2023-11-01

## 2023-11-01 DIAGNOSIS — R00.2 PALPITATIONS: Primary | ICD-10-CM

## 2023-11-01 NOTE — TELEPHONE ENCOUNTER
Refill request received via fax:   - propranolol (INDERAL XL) 80 MG extended release capsule    Pharmacy: [facility]

## 2023-11-02 ENCOUNTER — TELEPHONE (OUTPATIENT)
Age: 68
End: 2023-11-02

## 2023-11-02 NOTE — TELEPHONE ENCOUNTER
----- Message from Gilberto Aren sent at 11/1/2023 11:49 AM EDT -----  Subject: Refill Request    QUESTIONS  Name of Medication? propranolol (INDERAL XL) 80 MG extended release   capsule  Patient-reported dosage and instructions? 80MG, 1 Capsule Daily  How many days do you have left? 0  Preferred Pharmacy? [address]  Pharmacy phone number (if available)? [phone]  Additional Information for Provider? 90 day supply  ---------------------------------------------------------------------------  --------------  CALL BACK INFO  What is the best way for the office to contact you? OK to leave message on   voicemail  Preferred Call Back Phone Number? [phone]  ---------------------------------------------------------------------------  --------------  SCRIPT ANSWERS  Relationship to Patient?  Self

## 2023-11-21 ENCOUNTER — HOSPITAL ENCOUNTER (OUTPATIENT)
Facility: HOSPITAL | Age: 68
Setting detail: SPECIMEN
Discharge: HOME | End: 2023-11-24
Payer: MEDICARE

## 2023-11-21 DIAGNOSIS — R00.2 PALPITATIONS: ICD-10-CM

## 2023-11-21 DIAGNOSIS — E78.00 PURE HYPERCHOLESTEROLEMIA: ICD-10-CM

## 2023-11-21 LAB
ALBUMIN SERPL-MCNC: 3.8 G/DL (ref 3.4–5)
ALBUMIN/GLOB SERPL: 1.4 (ref 0.8–1.7)
ALP SERPL-CCNC: 82 U/L (ref 45–117)
ALT SERPL-CCNC: 18 U/L (ref 13–56)
ANION GAP SERPL CALC-SCNC: 4 MMOL/L (ref 3–18)
AST SERPL-CCNC: 13 U/L (ref 10–38)
BILIRUB SERPL-MCNC: 0.4 MG/DL (ref 0.2–1)
BUN SERPL-MCNC: 19 MG/DL (ref 7–18)
BUN/CREAT SERPL: 21 (ref 12–20)
CALCIUM SERPL-MCNC: 8.8 MG/DL (ref 8.5–10.1)
CHLORIDE SERPL-SCNC: 103 MMOL/L (ref 100–111)
CHOLEST SERPL-MCNC: 237 MG/DL
CO2 SERPL-SCNC: 29 MMOL/L (ref 21–32)
CREAT SERPL-MCNC: 0.91 MG/DL (ref 0.6–1.3)
GLOBULIN SER CALC-MCNC: 2.8 G/DL (ref 2–4)
GLUCOSE SERPL-MCNC: 104 MG/DL (ref 74–99)
HDLC SERPL-MCNC: 52 MG/DL (ref 40–60)
HDLC SERPL: 4.6 (ref 0–5)
LDLC SERPL CALC-MCNC: 154 MG/DL (ref 0–100)
LIPID PANEL: ABNORMAL
POTASSIUM SERPL-SCNC: 4.6 MMOL/L (ref 3.5–5.5)
PROT SERPL-MCNC: 6.6 G/DL (ref 6.4–8.2)
SODIUM SERPL-SCNC: 136 MMOL/L (ref 136–145)
TRIGL SERPL-MCNC: 155 MG/DL
VLDLC SERPL CALC-MCNC: 31 MG/DL

## 2023-11-21 PROCEDURE — 80053 COMPREHEN METABOLIC PANEL: CPT

## 2023-11-21 PROCEDURE — 80061 LIPID PANEL: CPT

## 2023-11-21 PROCEDURE — 36415 COLL VENOUS BLD VENIPUNCTURE: CPT

## 2023-11-30 ENCOUNTER — OFFICE VISIT (OUTPATIENT)
Age: 68
End: 2023-11-30
Payer: MEDICARE

## 2023-11-30 VITALS
SYSTOLIC BLOOD PRESSURE: 163 MMHG | HEART RATE: 66 BPM | TEMPERATURE: 96 F | RESPIRATION RATE: 18 BRPM | OXYGEN SATURATION: 98 % | BODY MASS INDEX: 27.8 KG/M2 | HEIGHT: 66 IN | DIASTOLIC BLOOD PRESSURE: 73 MMHG | WEIGHT: 173 LBS

## 2023-11-30 DIAGNOSIS — I10 ESSENTIAL HYPERTENSION: ICD-10-CM

## 2023-11-30 DIAGNOSIS — E78.00 PURE HYPERCHOLESTEROLEMIA: ICD-10-CM

## 2023-11-30 DIAGNOSIS — R51.9 NEW ONSET HEADACHE: ICD-10-CM

## 2023-11-30 DIAGNOSIS — Z00.00 MEDICARE ANNUAL WELLNESS VISIT, SUBSEQUENT: Primary | ICD-10-CM

## 2023-11-30 PROCEDURE — G8427 DOCREV CUR MEDS BY ELIG CLIN: HCPCS | Performed by: INTERNAL MEDICINE

## 2023-11-30 PROCEDURE — 99497 ADVNCD CARE PLAN 30 MIN: CPT | Performed by: INTERNAL MEDICINE

## 2023-11-30 PROCEDURE — 3017F COLORECTAL CA SCREEN DOC REV: CPT | Performed by: INTERNAL MEDICINE

## 2023-11-30 PROCEDURE — G8419 CALC BMI OUT NRM PARAM NOF/U: HCPCS | Performed by: INTERNAL MEDICINE

## 2023-11-30 PROCEDURE — G0439 PPPS, SUBSEQ VISIT: HCPCS | Performed by: INTERNAL MEDICINE

## 2023-11-30 PROCEDURE — 1123F ACP DISCUSS/DSCN MKR DOCD: CPT | Performed by: INTERNAL MEDICINE

## 2023-11-30 PROCEDURE — 99214 OFFICE O/P EST MOD 30 MIN: CPT | Performed by: INTERNAL MEDICINE

## 2023-11-30 PROCEDURE — 1036F TOBACCO NON-USER: CPT | Performed by: INTERNAL MEDICINE

## 2023-11-30 PROCEDURE — 1090F PRES/ABSN URINE INCON ASSESS: CPT | Performed by: INTERNAL MEDICINE

## 2023-11-30 PROCEDURE — 3078F DIAST BP <80 MM HG: CPT | Performed by: INTERNAL MEDICINE

## 2023-11-30 PROCEDURE — 3077F SYST BP >= 140 MM HG: CPT | Performed by: INTERNAL MEDICINE

## 2023-11-30 PROCEDURE — G8484 FLU IMMUNIZE NO ADMIN: HCPCS | Performed by: INTERNAL MEDICINE

## 2023-11-30 PROCEDURE — G8400 PT W/DXA NO RESULTS DOC: HCPCS | Performed by: INTERNAL MEDICINE

## 2023-11-30 RX ORDER — PROPRANOLOL HYDROCHLORIDE 120 MG/1
120 CAPSULE, EXTENDED RELEASE ORAL DAILY
Qty: 30 CAPSULE | Refills: 3 | Status: SHIPPED | OUTPATIENT
Start: 2023-11-30

## 2023-11-30 SDOH — ECONOMIC STABILITY: HOUSING INSECURITY
IN THE LAST 12 MONTHS, WAS THERE A TIME WHEN YOU DID NOT HAVE A STEADY PLACE TO SLEEP OR SLEPT IN A SHELTER (INCLUDING NOW)?: NO

## 2023-11-30 SDOH — ECONOMIC STABILITY: INCOME INSECURITY: HOW HARD IS IT FOR YOU TO PAY FOR THE VERY BASICS LIKE FOOD, HOUSING, MEDICAL CARE, AND HEATING?: NOT HARD AT ALL

## 2023-11-30 SDOH — ECONOMIC STABILITY: FOOD INSECURITY: WITHIN THE PAST 12 MONTHS, THE FOOD YOU BOUGHT JUST DIDN'T LAST AND YOU DIDN'T HAVE MONEY TO GET MORE.: NEVER TRUE

## 2023-11-30 SDOH — ECONOMIC STABILITY: FOOD INSECURITY: WITHIN THE PAST 12 MONTHS, YOU WORRIED THAT YOUR FOOD WOULD RUN OUT BEFORE YOU GOT MONEY TO BUY MORE.: NEVER TRUE

## 2023-11-30 ASSESSMENT — LIFESTYLE VARIABLES
HOW MANY STANDARD DRINKS CONTAINING ALCOHOL DO YOU HAVE ON A TYPICAL DAY: PATIENT DOES NOT DRINK
HOW OFTEN DO YOU HAVE A DRINK CONTAINING ALCOHOL: NEVER

## 2023-11-30 ASSESSMENT — ENCOUNTER SYMPTOMS
BLOOD IN STOOL: 0
SHORTNESS OF BREATH: 0

## 2023-11-30 NOTE — PROGRESS NOTES
Jannette Love presents today for   Chief Complaint   Patient presents with    Medicare AWV                 1. \"Have you been to the ER, urgent care clinic since your last visit? Hospitalized since your last visit? \" no    2. \"Have you seen or consulted any other health care providers outside of the [address] [place] [address] since your last visit? \" no     3. For patients aged 43-73: Has the patient had a colonoscopy / FIT/ Cologuard? Yes - no Care Gap present      If the patient is female:    4. For patients aged 43-66: Has the patient had a mammogram within the past 2 years? Yes - no Care Gap present      5. For patients aged 21-65: Has the patient had a pap smear?  Hysterectomy

## 2023-11-30 NOTE — PATIENT INSTRUCTIONS
Incorporated. Care instructions adapted under license by [facility] ([facility]). If you have questions about a medical condition or this instruction, always ask your healthcare professional. [address] any warranty or liability for your use of this information. Learning About Stress  What is stress? Stress is your body's response to a hard situation. Your body can have a physical, emotional, or mental response. Stress is a fact of life for most people, and it affects everyone differently. What causes stress for you may not be stressful for someone else. A lot of things can cause stress. You may feel stress when you go on a job interview, take a test, or run a race. This kind of short-term stress is normal and even useful. It can help you if you need to work hard or react quickly. For example, stress can help you finish an important job on time. Long-term stress is caused by ongoing stressful situations or events. Examples of long-term stress include long-term health problems, ongoing problems at work, or conflicts in your family. Long-term stress can harm your health. How does stress affect your health? When you are stressed, your body responds as though you are in danger. It makes hormones that speed up your heart, make you breathe faster, and give you a burst of energy. This is called the fight-or-flight stress response. If the stress is over quickly, your body goes back to normal and no harm is done. But if stress happens too often or lasts too long, it can have bad effects. Long-term stress can make you more likely to get sick, and it can make symptoms of some diseases worse. If you tense up when you are stressed, you may develop neck, shoulder, or low back pain. Stress is linked to high blood pressure and heart disease. Stress also harms your emotional health. It can make you hayes, tense, or depressed.  Your relationships may suffer, and you may not do well at work or

## 2023-11-30 NOTE — PROGRESS NOTES
HPI     Loy Lee is here for Medicare wellness/ACP visit. She has been very stressed due to her sons pain from sciatica; his MRI recently showed a large disc at L5-S1, he was placed on medication and will in consultation for probable surgery . She also has a headache today without fever or URI symptoms. We discussed that her blood pressure is high enough that we can attribute the headache to her blood pressure, but to let me know if the headache changes, worsens, or persists. Past Medical History:   Diagnosis Date    Anxiety, generalized     Benign hematuria     Depressive disorder     Dermatitis     Palpitations     Pure hypercholesterolemia         Past Surgical History:   Procedure Laterality Date    COLONOSCOPY N/A 2018    COLONOSCOPY / polypectomy performed by Monie Yanes MD at [facility] ENDOSCOPY    HYSTERECTOMY (CERVIX STATUS UNKNOWN)      age 45        Current Outpatient Medications   Medication Sig Dispense Refill    propranolol (INDERAL LA) 120 MG extended release capsule Take 1 capsule by mouth daily 30 capsule 3    propranolol (INDERAL XL) 80 MG extended release capsule Take 1 capsule by mouth daily 90 capsule 1    escitalopram (LEXAPRO) 20 MG tablet Take 1 tablet by mouth daily 90 tablet 1    LORazepam (ATIVAN) 0.5 MG tablet Take 1 tablet by mouth 2 times daily as needed for Anxiety for up to 180 days. Max Daily Amount: 1 mg 60 tablet 5     No current facility-administered medications for this visit.         Allergies   Allergen Reactions    Latex Dermatitis and Rash    Statins Myalgia        Social History     Socioeconomic History    Marital status:      Spouse name: Not on file    Number of children: Not on file    Years of education: Not on file    Highest education level: Not on file   Occupational History    Not on file   Tobacco Use    Smoking status: Former     Packs/day: 1     Types: Cigarettes     Start date: 1970     Quit date: 2000     Years since quittin.9

## 2023-12-01 NOTE — ACP (ADVANCE CARE PLANNING)
Advance Care Planning     Advance Care Planning (ACP) Physician/NP/PA Conversation    Date of Conversation: 11/30/2023  Conducted with: Patient with Decision Making Capacity    Healthcare Decision Maker:      Primary Decision Maker: Goran Arambula Spouse - [phone]    Secondary Decision Maker: Augusta Silverio - Child - [phone]    Click here to complete Healthcare Decision Makers including selection of the Healthcare Decision Maker Relationship (ie \"Primary\")      Care Preferences:    Hospitalization: \"If your health worsens and it becomes clear that your chance of recovery is unlikely, what would be your preference regarding hospitalization? \"  The patient would prefer comfort-focused treatment without hospitalization. Ventilation: \"If you were unable to breath on your own and your chance of recovery was unlikely, what would be your preference about the use of a ventilator (breathing machine) if it was available to you? \"  The patient would desire the use of a ventilator. Resuscitation: \"In the event your heart stopped as a result of an underlying serious health condition, would you want attempts made to restart your heart, or would you prefer a natural death? \"  Yes, attempt to resuscitate.     artificial nutrition NO    Conversation Outcomes / Follow-Up Plan:  ACP complete - no further action today  Reviewed DNR/DNI and patient elects Full Code (Attempt Resuscitation)    Length of Voluntary ACP Conversation in minutes:  16 minutes    Fausto Sheldon MD

## 2024-01-12 ENCOUNTER — OFFICE VISIT (OUTPATIENT)
Age: 69
End: 2024-01-12
Payer: MEDICARE

## 2024-01-12 VITALS
OXYGEN SATURATION: 98 % | WEIGHT: 174 LBS | SYSTOLIC BLOOD PRESSURE: 113 MMHG | HEART RATE: 54 BPM | TEMPERATURE: 97 F | RESPIRATION RATE: 18 BRPM | DIASTOLIC BLOOD PRESSURE: 67 MMHG | BODY MASS INDEX: 27.97 KG/M2 | HEIGHT: 66 IN

## 2024-01-12 DIAGNOSIS — G44.89 OTHER HEADACHE SYNDROME: ICD-10-CM

## 2024-01-12 DIAGNOSIS — I10 ESSENTIAL HYPERTENSION: Primary | ICD-10-CM

## 2024-01-12 PROCEDURE — G8484 FLU IMMUNIZE NO ADMIN: HCPCS | Performed by: INTERNAL MEDICINE

## 2024-01-12 PROCEDURE — 1123F ACP DISCUSS/DSCN MKR DOCD: CPT | Performed by: INTERNAL MEDICINE

## 2024-01-12 PROCEDURE — 99213 OFFICE O/P EST LOW 20 MIN: CPT | Performed by: INTERNAL MEDICINE

## 2024-01-12 PROCEDURE — 3074F SYST BP LT 130 MM HG: CPT | Performed by: INTERNAL MEDICINE

## 2024-01-12 PROCEDURE — 1090F PRES/ABSN URINE INCON ASSESS: CPT | Performed by: INTERNAL MEDICINE

## 2024-01-12 PROCEDURE — G8419 CALC BMI OUT NRM PARAM NOF/U: HCPCS | Performed by: INTERNAL MEDICINE

## 2024-01-12 PROCEDURE — G8400 PT W/DXA NO RESULTS DOC: HCPCS | Performed by: INTERNAL MEDICINE

## 2024-01-12 PROCEDURE — 1036F TOBACCO NON-USER: CPT | Performed by: INTERNAL MEDICINE

## 2024-01-12 PROCEDURE — G8427 DOCREV CUR MEDS BY ELIG CLIN: HCPCS | Performed by: INTERNAL MEDICINE

## 2024-01-12 PROCEDURE — 3078F DIAST BP <80 MM HG: CPT | Performed by: INTERNAL MEDICINE

## 2024-01-12 PROCEDURE — 3017F COLORECTAL CA SCREEN DOC REV: CPT | Performed by: INTERNAL MEDICINE

## 2024-01-12 RX ORDER — PROPRANOLOL HYDROCHLORIDE 120 MG/1
120 CAPSULE, EXTENDED RELEASE ORAL DAILY
Qty: 90 CAPSULE | Refills: 3 | Status: SHIPPED | OUTPATIENT
Start: 2024-01-12

## 2024-01-12 ASSESSMENT — PATIENT HEALTH QUESTIONNAIRE - PHQ9
SUM OF ALL RESPONSES TO PHQ QUESTIONS 1-9: 0
SUM OF ALL RESPONSES TO PHQ QUESTIONS 1-9: 0
SUM OF ALL RESPONSES TO PHQ9 QUESTIONS 1 & 2: 0
1. LITTLE INTEREST OR PLEASURE IN DOING THINGS: 0
SUM OF ALL RESPONSES TO PHQ QUESTIONS 1-9: 0
2. FEELING DOWN, DEPRESSED OR HOPELESS: 0
SUM OF ALL RESPONSES TO PHQ QUESTIONS 1-9: 0

## 2024-01-12 ASSESSMENT — ENCOUNTER SYMPTOMS
SHORTNESS OF BREATH: 0
BLOOD IN STOOL: 0

## 2024-01-12 NOTE — PROGRESS NOTES
Joana KEVIN Dowell presents today for   Chief Complaint   Patient presents with    Hypertension                 1. \"Have you been to the ER, urgent care clinic since your last visit?  Hospitalized since your last visit?\" no    2. \"Have you seen or consulted any other health care providers outside of the [facility] since your last visit?\" no     3. For patients aged 45-75: Has the patient had a colonoscopy / FIT/ Cologuard? Yes - no Care Gap present      If the patient is female:    4. For patients aged 40-74: Has the patient had a mammogram within the past 2 years? Yes - no Care Gap present      5. For patients aged 21-65: Has the patient had a pap smear? Hysterectomy

## 2024-01-13 NOTE — PROGRESS NOTES
Hypertension  Pertinent negatives include no chest pain, headaches or shortness of breath.        Joana Dowell is here for follow-up of her hypertension.  She has not been checking it at home, but notices that her headaches have resolved.  She also feels less stressed since her son, also my patient, had back surgery for herniated disc that was causing him severe pain.  No new family history reported.    Past Medical History:   Diagnosis Date    Anxiety, generalized     Benign hematuria     Depressive disorder     Dermatitis     Palpitations     Pure hypercholesterolemia         Past Surgical History:   Procedure Laterality Date    COLONOSCOPY N/A 2018    COLONOSCOPY / polypectomy performed by José Miguel Lopez MD at [facility] ENDOSCOPY    HYSTERECTOMY (CERVIX STATUS UNKNOWN)      age 38        Current Outpatient Medications   Medication Sig Dispense Refill    propranolol (INDERAL LA) 120 MG extended release capsule Take 1 capsule by mouth daily 90 capsule 3    escitalopram (LEXAPRO) 20 MG tablet Take 1 tablet by mouth daily 90 tablet 1    LORazepam (ATIVAN) 0.5 MG tablet Take 1 tablet by mouth 2 times daily as needed for Anxiety for up to 180 days. Max Daily Amount: 1 mg 60 tablet 5     No current facility-administered medications for this visit.        Allergies   Allergen Reactions    Latex Dermatitis and Rash    Statins Myalgia        Social History     Socioeconomic History    Marital status:      Spouse name: Not on file    Number of children: Not on file    Years of education: Not on file    Highest education level: Not on file   Occupational History    Not on file   Tobacco Use    Smoking status: Former     Current packs/day: 0.00     Average packs/day: 1 pack/day for 30.0 years (30.0 ttl pk-yrs)     Types: Cigarettes     Start date: 1970     Quit date: 2000     Years since quittin.0    Smokeless tobacco: Never   Substance and Sexual Activity    Alcohol use: Yes    Drug use: Not on file

## 2024-07-01 ENCOUNTER — TELEPHONE (OUTPATIENT)
Facility: CLINIC | Age: 69
End: 2024-07-01

## 2024-07-10 NOTE — PROGRESS NOTES
Joana Dowell presents today for BP.              1. \"Have you been to the ER, urgent care clinic since your last visit?  Hospitalized since your last visit?\" no    2. \"Have you seen or consulted any other health care providers outside of the [facility] since your last visit?\" no     3. For patients aged 45-75: Has the patient had a colonoscopy / FIT/ Cologuard? Yes - no Care Gap present      If the patient is female:    4. For patients aged 40-74: Has the patient had a mammogram within the past 2 years? Yes - no Care Gap present      5. For patients aged 21-65: Has the patient had a pap smear? NA - based on age or sex

## 2024-07-12 ENCOUNTER — OFFICE VISIT (OUTPATIENT)
Facility: CLINIC | Age: 69
End: 2024-07-12

## 2024-07-12 VITALS
TEMPERATURE: 97.1 F | HEART RATE: 56 BPM | HEIGHT: 66 IN | DIASTOLIC BLOOD PRESSURE: 59 MMHG | WEIGHT: 154 LBS | BODY MASS INDEX: 24.75 KG/M2 | SYSTOLIC BLOOD PRESSURE: 113 MMHG | RESPIRATION RATE: 18 BRPM | OXYGEN SATURATION: 97 %

## 2024-07-12 DIAGNOSIS — M54.50 MIDLINE LOW BACK PAIN WITHOUT SCIATICA, UNSPECIFIED CHRONICITY: Primary | ICD-10-CM

## 2024-07-12 DIAGNOSIS — F41.1 ANXIETY, GENERALIZED: ICD-10-CM

## 2024-07-12 DIAGNOSIS — I10 ESSENTIAL HYPERTENSION: ICD-10-CM

## 2024-07-12 DIAGNOSIS — R63.4 WEIGHT LOSS: ICD-10-CM

## 2024-07-12 RX ORDER — LORAZEPAM 1 MG/1
1 TABLET ORAL 2 TIMES DAILY
COMMUNITY
Start: 2024-06-19

## 2024-07-12 ASSESSMENT — ENCOUNTER SYMPTOMS
SHORTNESS OF BREATH: 0
ABDOMINAL PAIN: 0
BLOOD IN STOOL: 0
BACK PAIN: 1

## 2024-07-12 ASSESSMENT — PATIENT HEALTH QUESTIONNAIRE - PHQ9
SUM OF ALL RESPONSES TO PHQ9 QUESTIONS 1 & 2: 0
2. FEELING DOWN, DEPRESSED OR HOPELESS: NOT AT ALL
SUM OF ALL RESPONSES TO PHQ QUESTIONS 1-9: 0
1. LITTLE INTEREST OR PLEASURE IN DOING THINGS: NOT AT ALL

## 2024-07-13 NOTE — PROGRESS NOTES
Hypertension  Pertinent negatives include no chest pain or shortness of breath.        Joana Dowell is here with several months of low back pain.  She has been wearing a brace.  She has not taken anything for pain, because she says that anytime she takes pain medicine, including Tylenol, she has rectal bleeding.  She is up-to-date on her colonoscopy.    I noticed she has lost about 20 pounds in the past 6 months, about 10 pounds total over the last 18 months.  It is unclear to me if this is delivered, she says she is eating less, for example taking half the bottom of her hamburgers.  I am not sure if her appetite is decreased, but she denies dysgeusia.    She says she feels like all the cushioning in her low back is gone, and it is difficult for her to sit up straight without supporting herself on something.  She agrees to x-rays of her low back.    Past Medical History:   Diagnosis Date   • Anxiety, generalized    • Benign hematuria    • Depressive disorder    • Dermatitis    • Palpitations    • Pure hypercholesterolemia         Past Surgical History:   Procedure Laterality Date   • COLONOSCOPY N/A 8/8/2018    COLONOSCOPY / polypectomy performed by José Miguel Lopez MD at [facility] ENDOSCOPY   • HYSTERECTOMY (CERVIX STATUS UNKNOWN)      age 38        Current Outpatient Medications   Medication Sig Dispense Refill   • LORazepam (ATIVAN) 1 MG tablet Take 1 tablet by mouth 2 times daily.     • propranolol (INDERAL LA) 120 MG extended release capsule Take 1 capsule by mouth daily 90 capsule 3   • escitalopram (LEXAPRO) 20 MG tablet Take 1 tablet by mouth daily 90 tablet 1     No current facility-administered medications for this visit.        Allergies   Allergen Reactions   • Latex Dermatitis and Rash   • Statins Myalgia        Social History     Socioeconomic History   • Marital status:      Spouse name: Not on file   • Number of children: Not on file   • Years of education: Not on file   • Highest education level:

## 2024-07-17 ENCOUNTER — HOSPITAL ENCOUNTER (OUTPATIENT)
Facility: HOSPITAL | Age: 69
Discharge: HOME | End: 2024-07-20
Payer: MEDICARE

## 2024-07-17 DIAGNOSIS — M54.50 MIDLINE LOW BACK PAIN WITHOUT SCIATICA, UNSPECIFIED CHRONICITY: ICD-10-CM

## 2024-07-17 PROCEDURE — 72110 X-RAY EXAM L-2 SPINE 4/>VWS: CPT

## 2024-07-23 ENCOUNTER — TELEPHONE (OUTPATIENT)
Facility: CLINIC | Age: 69
End: 2024-07-23

## 2024-07-23 DIAGNOSIS — M54.50 MIDLINE LOW BACK PAIN WITHOUT SCIATICA, UNSPECIFIED CHRONICITY: Primary | ICD-10-CM

## 2024-07-24 NOTE — TELEPHONE ENCOUNTER
Let her know her spine x-ray shows bone spurs and loss of disc space between her vertebrae, this is probably why she feels like there is a lack of cushioning between the bones in her back.    Let me know if she would like physical therapy, referral to a back specialist, or recommendations for pain relief.

## 2024-08-09 ENCOUNTER — OFFICE VISIT (OUTPATIENT)
Age: 69
End: 2024-08-09
Payer: MEDICARE

## 2024-08-09 VITALS
WEIGHT: 153 LBS | HEART RATE: 54 BPM | OXYGEN SATURATION: 97 % | DIASTOLIC BLOOD PRESSURE: 62 MMHG | TEMPERATURE: 96.8 F | BODY MASS INDEX: 24.59 KG/M2 | HEIGHT: 66 IN | SYSTOLIC BLOOD PRESSURE: 117 MMHG

## 2024-08-09 DIAGNOSIS — G89.29 CHRONIC BILATERAL LOW BACK PAIN WITHOUT SCIATICA: ICD-10-CM

## 2024-08-09 DIAGNOSIS — M54.50 CHRONIC BILATERAL LOW BACK PAIN WITHOUT SCIATICA: ICD-10-CM

## 2024-08-09 DIAGNOSIS — M47.816 LUMBAR FACET ARTHROPATHY: Primary | ICD-10-CM

## 2024-08-09 DIAGNOSIS — M51.36 DDD (DEGENERATIVE DISC DISEASE), LUMBAR: ICD-10-CM

## 2024-08-09 PROCEDURE — 1036F TOBACCO NON-USER: CPT | Performed by: NURSE PRACTITIONER

## 2024-08-09 PROCEDURE — 3017F COLORECTAL CA SCREEN DOC REV: CPT | Performed by: NURSE PRACTITIONER

## 2024-08-09 PROCEDURE — 99203 OFFICE O/P NEW LOW 30 MIN: CPT | Performed by: NURSE PRACTITIONER

## 2024-08-09 PROCEDURE — G8427 DOCREV CUR MEDS BY ELIG CLIN: HCPCS | Performed by: NURSE PRACTITIONER

## 2024-08-09 PROCEDURE — 1123F ACP DISCUSS/DSCN MKR DOCD: CPT | Performed by: NURSE PRACTITIONER

## 2024-08-09 PROCEDURE — 1090F PRES/ABSN URINE INCON ASSESS: CPT | Performed by: NURSE PRACTITIONER

## 2024-08-09 PROCEDURE — G8420 CALC BMI NORM PARAMETERS: HCPCS | Performed by: NURSE PRACTITIONER

## 2024-08-09 PROCEDURE — G8400 PT W/DXA NO RESULTS DOC: HCPCS | Performed by: NURSE PRACTITIONER

## 2024-08-09 NOTE — PROGRESS NOTES
Joana BORWN Delmer presents today for   Chief Complaint   Patient presents with    Lower Back Pain       Is someone accompanying this pt? Yes, male     Is the patient using any DME equipment during OV? no    Coordination of Care:  1. Have you been to the ER, urgent care clinic since your last visit? no  Hospitalized since your last visit? no    2. Have you seen or consulted any other health care providers outside of the [facility] since your last visit? Yes, pcp  Include any pap smears or colon screening. no

## 2024-08-09 NOTE — PROGRESS NOTES
Chief complaint   Chief Complaint   Patient presents with    Lower Back Pain       History of Present Illness:  Joana Dowell is a  68 y.o.  female who comes in today as a new patient referred by her PCP.  She states she has had back pain off and on for about 30 years.  She states as a child she fell out of the boat that was in a truck and landed on her left side.  She states her back used to lock up after that.  She states her parents did not take her to the doctor or hospital at that time.  She states that since November 2023 she has had terrible back pain without injury.  She describes it as a aching burning pain that is worse in the evening.  She states it feels like her back separates if she turns a certain way.  She has tried heat and lidocaine patches along with steroids which have not helped.  She is unable to take NSAIDs because they cause bleeding for her.  She is using a back brace 24/7 and she states without the back brace she would not be able to walk.  She denies fever bowel bladder dysfunction.  She did have x-ray done that showed facet arthropathy and degenerative disc disease throughout her spine and worse in the lower part of the spine.  She does not work.  She is a non-smoker.  She is here today with her .      Past Medical History:    Depression and anxiety, palpitations, hyperlipidemia, hypertension    Past Surgical History:    Hysterectomy      Physical Exam: The patient is a 68-year-old female well-developed well-nourished who is alert and oriented with a normal mood and affect.  She does have the back brace on currently.  She has a slow but full weightbearing nonantalgic gait.  She denies any assist device.  She has 4 out of 5 strength bilateral lower extremities.  Negative straight leg raise.  She has pain with hyperextension lumbar spine      Assessment and Plan:.  This is a patient who has back pain without radicular pain.  I did explain to her that using a back brace 24/7 is not

## 2024-12-10 ENCOUNTER — TELEPHONE (OUTPATIENT)
Facility: CLINIC | Age: 69
End: 2024-12-10

## 2024-12-10 NOTE — TELEPHONE ENCOUNTER
Patient was notified of Dr. Coyle's [facility]. Patient aware that future appointments with Dr. Coyle have been cancelled and [facility] will provide patient the needed refills on medication for 90 days.   Patient was offered the phone numbers of offices in [facility] taking new patients, [facility] [phone] and [facility] [phone].  All questions were answered and patient voiced understanding.    patient's belongings returned

## 2024-12-16 ENCOUNTER — HOSPITAL ENCOUNTER (OUTPATIENT)
Facility: HOSPITAL | Age: 69
Setting detail: SPECIMEN
Discharge: HOME | End: 2024-12-19
Payer: MEDICARE

## 2024-12-16 ENCOUNTER — OFFICE VISIT (OUTPATIENT)
Facility: CLINIC | Age: 69
End: 2024-12-16

## 2024-12-16 VITALS
OXYGEN SATURATION: 96 % | RESPIRATION RATE: 16 BRPM | HEART RATE: 56 BPM | DIASTOLIC BLOOD PRESSURE: 67 MMHG | SYSTOLIC BLOOD PRESSURE: 125 MMHG | WEIGHT: 155.2 LBS | TEMPERATURE: 97.1 F | HEIGHT: 66 IN | BODY MASS INDEX: 24.94 KG/M2

## 2024-12-16 DIAGNOSIS — R79.9 ABNORMAL BLOOD CHEMISTRY: ICD-10-CM

## 2024-12-16 DIAGNOSIS — E78.2 HYPERLIPIDEMIA, MIXED: ICD-10-CM

## 2024-12-16 DIAGNOSIS — Z23 NEED FOR PNEUMOCOCCAL VACCINE: ICD-10-CM

## 2024-12-16 DIAGNOSIS — Z23 NEEDS FLU SHOT: ICD-10-CM

## 2024-12-16 DIAGNOSIS — E78.2 HYPERLIPIDEMIA, MIXED: Primary | ICD-10-CM

## 2024-12-16 DIAGNOSIS — I10 HYPERTENSION, ESSENTIAL: ICD-10-CM

## 2024-12-16 DIAGNOSIS — F33.0 RECURRENT MILD MAJOR DEPRESSIVE DISORDER WITH ANXIETY (HCC): ICD-10-CM

## 2024-12-16 DIAGNOSIS — M51.360 DEGENERATION OF INTERVERTEBRAL DISC OF LUMBAR REGION WITH DISCOGENIC BACK PAIN: ICD-10-CM

## 2024-12-16 DIAGNOSIS — F41.9 RECURRENT MILD MAJOR DEPRESSIVE DISORDER WITH ANXIETY (HCC): ICD-10-CM

## 2024-12-16 LAB
ALBUMIN SERPL-MCNC: 3.7 G/DL (ref 3.4–5)
ALBUMIN/GLOB SERPL: 1.3 (ref 0.8–1.7)
ALP SERPL-CCNC: 80 U/L (ref 45–117)
ALT SERPL-CCNC: 16 U/L (ref 13–56)
ANION GAP SERPL CALC-SCNC: 4 MMOL/L (ref 3–18)
APPEARANCE UR: CLEAR
AST SERPL-CCNC: 12 U/L (ref 10–38)
BACTERIA URNS QL MICRO: ABNORMAL /HPF
BASOPHILS # BLD: 0.1 K/UL (ref 0–0.1)
BASOPHILS NFR BLD: 1 % (ref 0–2)
BILIRUB SERPL-MCNC: 0.5 MG/DL (ref 0.2–1)
BILIRUB UR QL: NEGATIVE
BUN SERPL-MCNC: 9 MG/DL (ref 7–18)
BUN/CREAT SERPL: 11 (ref 12–20)
CALCIUM SERPL-MCNC: 9.2 MG/DL (ref 8.5–10.1)
CHLORIDE SERPL-SCNC: 102 MMOL/L (ref 100–111)
CHOLEST SERPL-MCNC: 281 MG/DL
CO2 SERPL-SCNC: 28 MMOL/L (ref 21–32)
COLOR UR: YELLOW
CREAT SERPL-MCNC: 0.85 MG/DL (ref 0.6–1.3)
DIFFERENTIAL METHOD BLD: ABNORMAL
EOSINOPHIL # BLD: 0.6 K/UL (ref 0–0.4)
EOSINOPHIL NFR BLD: 9 % (ref 0–5)
EPITH CASTS URNS QL MICRO: ABNORMAL /LPF (ref 0–5)
ERYTHROCYTE [DISTWIDTH] IN BLOOD BY AUTOMATED COUNT: 12.9 % (ref 11.6–14.5)
EST. AVERAGE GLUCOSE BLD GHB EST-MCNC: 105 MG/DL
GLOBULIN SER CALC-MCNC: 2.9 G/DL (ref 2–4)
GLUCOSE SERPL-MCNC: 93 MG/DL (ref 74–99)
GLUCOSE UR STRIP.AUTO-MCNC: NEGATIVE MG/DL
HBA1C MFR BLD: 5.3 % (ref 4.2–5.6)
HCT VFR BLD AUTO: 40.6 % (ref 35–45)
HDLC SERPL-MCNC: 56 MG/DL (ref 40–60)
HDLC SERPL: 5 (ref 0–5)
HGB BLD-MCNC: 13.1 G/DL (ref 12–16)
HGB UR QL STRIP: NEGATIVE
IMM GRANULOCYTES # BLD AUTO: 0 K/UL (ref 0–0.04)
IMM GRANULOCYTES NFR BLD AUTO: 0 % (ref 0–0.5)
KETONES UR QL STRIP.AUTO: NEGATIVE MG/DL
LDLC SERPL CALC-MCNC: 187.8 MG/DL (ref 0–100)
LEUKOCYTE ESTERASE UR QL STRIP.AUTO: ABNORMAL
LIPID PANEL: ABNORMAL
LYMPHOCYTES # BLD: 2 K/UL (ref 0.9–3.6)
LYMPHOCYTES NFR BLD: 28 % (ref 21–52)
MCH RBC QN AUTO: 29.7 PG (ref 24–34)
MCHC RBC AUTO-ENTMCNC: 32.3 G/DL (ref 31–37)
MCV RBC AUTO: 92.1 FL (ref 78–100)
MONOCYTES # BLD: 0.3 K/UL (ref 0.05–1.2)
MONOCYTES NFR BLD: 5 % (ref 3–10)
MUCOUS THREADS URNS QL MICRO: ABNORMAL /LPF
NEUTS SEG # BLD: 4.2 K/UL (ref 1.8–8)
NEUTS SEG NFR BLD: 58 % (ref 40–73)
NITRITE UR QL STRIP.AUTO: NEGATIVE
NRBC # BLD: 0 K/UL (ref 0–0.01)
NRBC BLD-RTO: 0 PER 100 WBC
PH UR STRIP: 6 (ref 5–8)
PLATELET # BLD AUTO: 275 K/UL (ref 135–420)
PMV BLD AUTO: 11.8 FL (ref 9.2–11.8)
POTASSIUM SERPL-SCNC: 4.2 MMOL/L (ref 3.5–5.5)
PROT SERPL-MCNC: 6.6 G/DL (ref 6.4–8.2)
PROT UR STRIP-MCNC: NEGATIVE MG/DL
RBC # BLD AUTO: 4.41 M/UL (ref 4.2–5.3)
RBC #/AREA URNS HPF: NEGATIVE /HPF (ref 0–5)
SODIUM SERPL-SCNC: 134 MMOL/L (ref 136–145)
SP GR UR REFRACTOMETRY: 1.01 (ref 1–1.03)
TRIGL SERPL-MCNC: 186 MG/DL
UROBILINOGEN UR QL STRIP.AUTO: 0.2 EU/DL (ref 0.2–1)
VLDLC SERPL CALC-MCNC: 37.2 MG/DL
WBC # BLD AUTO: 7.3 K/UL (ref 4.6–13.2)
WBC URNS QL MICRO: ABNORMAL /HPF (ref 0–5)

## 2024-12-16 PROCEDURE — 81001 URINALYSIS AUTO W/SCOPE: CPT

## 2024-12-16 PROCEDURE — 80061 LIPID PANEL: CPT

## 2024-12-16 PROCEDURE — 83036 HEMOGLOBIN GLYCOSYLATED A1C: CPT

## 2024-12-16 PROCEDURE — 36415 COLL VENOUS BLD VENIPUNCTURE: CPT

## 2024-12-16 PROCEDURE — 80053 COMPREHEN METABOLIC PANEL: CPT

## 2024-12-16 PROCEDURE — 85025 COMPLETE CBC W/AUTO DIFF WBC: CPT

## 2024-12-16 RX ORDER — PRAVASTATIN SODIUM 20 MG
20 TABLET ORAL EVERY EVENING
Qty: 30 TABLET | Refills: 3 | Status: SHIPPED | OUTPATIENT
Start: 2024-12-16

## 2024-12-16 SDOH — ECONOMIC STABILITY: FOOD INSECURITY: WITHIN THE PAST 12 MONTHS, THE FOOD YOU BOUGHT JUST DIDN'T LAST AND YOU DIDN'T HAVE MONEY TO GET MORE.: SOMETIMES TRUE

## 2024-12-16 SDOH — ECONOMIC STABILITY: FOOD INSECURITY: WITHIN THE PAST 12 MONTHS, YOU WORRIED THAT YOUR FOOD WOULD RUN OUT BEFORE YOU GOT MONEY TO BUY MORE.: SOMETIMES TRUE

## 2024-12-16 SDOH — ECONOMIC STABILITY: INCOME INSECURITY: HOW HARD IS IT FOR YOU TO PAY FOR THE VERY BASICS LIKE FOOD, HOUSING, MEDICAL CARE, AND HEATING?: NOT VERY HARD

## 2024-12-16 ASSESSMENT — ENCOUNTER SYMPTOMS
ABDOMINAL PAIN: 0
COUGH: 0
ABDOMINAL DISTENTION: 0
SHORTNESS OF BREATH: 0
SORE THROAT: 0

## 2024-12-16 NOTE — PROGRESS NOTES
\"Have you been to the ER, urgent care clinic since your last visit?  Hospitalized since your last visit?\"    NO    “Have you seen or consulted any other health care providers outside our system since your last visit?”    NO    Have you had a mammogram?”   NO    Date of last Mammogram: 11/4/2022

## 2024-12-16 NOTE — PROGRESS NOTES
Joana Dowell (:  1955) is a 69 y.o. female,New patient, here for evaluation of the following chief complaint(s):  Other (Patient here to establish care.) and Swelling (Patient reports swelling above right eye)         Assessment & Plan  Hyperlipidemia, mixed  Patient has not been treating this problem because she did not tolerate previous medications (Crestor and Lipitor).  She is currently following a low-fat diet.  I am recommending a trial of Pravachol.    Orders:    pravastatin (PRAVACHOL) 20 MG tablet; Take 1 tablet by mouth every evening    Lipid Panel; Future    Hypertension, essential   Chronic, at goal (stable), under control on current regimen.    Orders:    CBC with Auto Differential; Future    Comprehensive Metabolic Panel; Future    Urinalysis; Future    Abnormal blood chemistry       Orders:    Hemoglobin A1C; Future    Degeneration of intervertebral disc of lumbar region with discogenic back pain   Chronic, at goal (stable), under fairly good control at present.  Patient does not wish to take pain medication.         Recurrent mild major depressive disorder with anxiety (HCC)    Patient is followed by her psychiatrist, Dr. Rosa.  He prescribes her psych medications which she states she must take every day in order to feel fine.         Needs flu shot    Patient requesting a flu shot today.    Orders:    Influenza, FLUAD Trivalent, (age 65 y+), IM, Preservative Free, 0.5mL    Need for pneumococcal vaccine    Patient requesting a pneumonia shot today.    Orders:    Pneumococcal, PCV20, PREVNAR 20, (age 6w+), IM, PF      No follow-ups on file.       Subjective   Today's visit is an initial visit to establish a physician-patient relationship.  This 69-year-old female comes in today to establish care.  She is a previous patient of Dr. Coyle who recently retired.  She has a history of chronic low back pain and lumbar facet arthropathy.  She has hypertension which has been under good

## 2024-12-17 DIAGNOSIS — E78.2 HYPERLIPIDEMIA, MIXED: ICD-10-CM

## 2024-12-26 RX ORDER — PRAVASTATIN SODIUM 20 MG
20 TABLET ORAL EVERY EVENING
Qty: 90 TABLET | Refills: 1 | Status: SHIPPED | OUTPATIENT
Start: 2024-12-26

## 2025-02-04 DIAGNOSIS — I10 ESSENTIAL HYPERTENSION: ICD-10-CM

## 2025-02-04 NOTE — TELEPHONE ENCOUNTER
Patient requesting medication refill     propranolol (INDERAL LA) 120 MG extended release capsule   [facility]/pharmacy #[id number] - [place], [place] - [address] - P [phone] - F [phone] [address] [address]  Phone: [phone]  Fax: [phone]

## 2025-02-05 RX ORDER — PROPRANOLOL HYDROCHLORIDE 120 MG/1
120 CAPSULE, EXTENDED RELEASE ORAL DAILY
Qty: 90 CAPSULE | Refills: 3 | Status: SHIPPED | OUTPATIENT
Start: 2025-02-05

## 2025-02-12 DIAGNOSIS — I10 ESSENTIAL HYPERTENSION: ICD-10-CM

## 2025-02-12 NOTE — TELEPHONE ENCOUNTER
Patient says her medication was sent to the wrong pharmacy. She no longer uses [facility]. Requesting her medication be resent     Medication  propranolol (INDERAL LA) 120 MG extended release capsule [[id number]]         Pharmacy   [facility]/pharmacy #[id number] - [place], [place] - [address] - P [phone] - F [phone] [address] [address]  Phone: [phone]  Fax: [phone]

## 2025-02-14 RX ORDER — PROPRANOLOL HYDROCHLORIDE 120 MG/1
120 CAPSULE, EXTENDED RELEASE ORAL DAILY
Qty: 90 CAPSULE | Refills: 3 | Status: SHIPPED | OUTPATIENT
Start: 2025-02-14

## 2025-07-21 NOTE — PROCEDURES
[facility]   *** FINAL REPORT ***    Name: Natalie De Dios  MRN: [id number]       Outpatient  : 1955  HIS Order #: [id number]  [id number] [facility] Visit #: [id number]  Date: 2018    TYPE OF TEST: Peripheral Venous Testing    REASON FOR TEST  Venous Insufficiency, Follow up Closure    Left Leg:-  Deep venous thrombosis:           Not examined  Superficial venous thrombosis:    Not examined  Deep venous insufficiency:        Not examined  Superficial venous insufficiency: Yes    Abnormal Valve Closure Times (seconds):    Left SFJ:             0.8      INTERPRETATION/FINDINGS  Post Closure Procedure   Date: 18  Duplex images were obtained using 2-D gray scale, color flow and  spectral doppler analysis. 1. Mechanical ablation of the left great saphenous vein with  involvement of the common femoral vein. EHIT with non occlusive  thrombus in the CFV. 2. Classification: T2:2a from junction to distal upper thigh. ADDITIONAL COMMENTS  ARTURO Márquez notified of findings. I have personally reviewed the data relevant to the interpretation of  this  study. TECHNOLOGIST: Susan Oliveira RDMS  Signed: 2018 03:53 PM    PHYSICIAN: Bryan Street D.O.   Signed: 2018 09:08 AM Quality 226: Preventive Care And Screening: Tobacco Use: Screening And Cessation Intervention: Patient screened for tobacco use and is an ex/non-smoker Detail Level: Detailed Quality 47: Advance Care Plan: Advance Care Planning discussed and documented; advance care plan or surrogate decision maker documented in the medical record. Quality 431: Preventive Care And Screening: Unhealthy Alcohol Use - Screening: Patient not identified as an unhealthy alcohol user when screened for unhealthy alcohol use using a systematic screening method

## 2025-08-27 DIAGNOSIS — E78.2 HYPERLIPIDEMIA, MIXED: ICD-10-CM

## 2025-08-27 RX ORDER — PRAVASTATIN SODIUM 20 MG
20 TABLET ORAL EVERY EVENING
Qty: 90 TABLET | Refills: 3 | Status: SHIPPED | OUTPATIENT
Start: 2025-08-27

## (undated) DEVICE — Device

## (undated) DEVICE — REM POLYHESIVE ADULT PATIENT RETURN ELECTRODE: Brand: VALLEYLAB

## (undated) DEVICE — CATH IV SAFE STR 22GX1IN BLU -- PROTECTIV PLUS

## (undated) DEVICE — FLEX ADVANTAGE 1500CC: Brand: FLEX ADVANTAGE

## (undated) DEVICE — TRAP SPEC COLL POLYP POLYSTYR --

## (undated) DEVICE — AIRLIFE™ NASAL OXYGEN CANNULA CURVED, NONFLARED TIP WITH 14 FOOT (4.3 M) CRUSH-RESISTANT TUBING, OVER-THE-EAR STYLE: Brand: AIRLIFE™

## (undated) DEVICE — MEDI-VAC NON-CONDUCTIVE SUCTION TUBING: Brand: CARDINAL HEALTH

## (undated) DEVICE — FORCEPS BX L240CM JAW DIA2.8MM L CAP W/ NDL MIC MESH TOOTH

## (undated) DEVICE — SNARE POLYP M W27MMXL240CM OVL STIFF DISP CAPTIVATOR